# Patient Record
Sex: FEMALE | Race: ASIAN | NOT HISPANIC OR LATINO | URBAN - METROPOLITAN AREA
[De-identification: names, ages, dates, MRNs, and addresses within clinical notes are randomized per-mention and may not be internally consistent; named-entity substitution may affect disease eponyms.]

---

## 2020-09-27 ENCOUNTER — EMERGENCY (EMERGENCY)
Facility: HOSPITAL | Age: 55
LOS: 1 days | Discharge: HOME | End: 2020-09-27
Attending: EMERGENCY MEDICINE | Admitting: EMERGENCY MEDICINE
Payer: COMMERCIAL

## 2020-09-27 VITALS
RESPIRATION RATE: 16 BRPM | TEMPERATURE: 98 F | HEART RATE: 97 BPM | DIASTOLIC BLOOD PRESSURE: 118 MMHG | OXYGEN SATURATION: 100 % | SYSTOLIC BLOOD PRESSURE: 165 MMHG

## 2020-09-27 VITALS
RESPIRATION RATE: 16 BRPM | HEART RATE: 113 BPM | DIASTOLIC BLOOD PRESSURE: 110 MMHG | OXYGEN SATURATION: 97 % | TEMPERATURE: 98 F | SYSTOLIC BLOOD PRESSURE: 148 MMHG | HEIGHT: 65 IN

## 2020-09-27 PROCEDURE — 99283 EMERGENCY DEPT VISIT LOW MDM: CPT

## 2020-09-27 PROCEDURE — 73130 X-RAY EXAM OF HAND: CPT | Mod: 26,LT

## 2020-09-27 PROCEDURE — 99053 MED SERV 10PM-8AM 24 HR FAC: CPT

## 2020-09-27 RX ORDER — TETANUS TOXOID, REDUCED DIPHTHERIA TOXOID AND ACELLULAR PERTUSSIS VACCINE, ADSORBED 5; 2.5; 8; 8; 2.5 [IU]/.5ML; [IU]/.5ML; UG/.5ML; UG/.5ML; UG/.5ML
0.5 SUSPENSION INTRAMUSCULAR ONCE
Refills: 0 | Status: COMPLETED | OUTPATIENT
Start: 2020-09-27 | End: 2020-09-27

## 2020-09-27 RX ADMIN — TETANUS TOXOID, REDUCED DIPHTHERIA TOXOID AND ACELLULAR PERTUSSIS VACCINE, ADSORBED 0.5 MILLILITER(S): 5; 2.5; 8; 8; 2.5 SUSPENSION INTRAMUSCULAR at 23:37

## 2020-09-27 RX ADMIN — Medication 1 TABLET(S): at 23:36
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
## 2020-09-27 NOTE — ED PROVIDER NOTE - CONSTITUTIONAL, MLM
normal... Well appearing, awake, alert, oriented to person, place, time/situation and in no apparent distress. Well appearing, awake, alert, oriented to person, place, and in no apparent distress.
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
## 2020-09-27 NOTE — ED PROVIDER NOTE - PROGRESS NOTE DETAILS
MARK Rhodes: Spoke with [facility] [employer] night manager Briana, informed her pt will need to take Augmentin BID x 5 days, updated tetanus. Aids at bedside can call when pt is discharged to be picked up. MARK Rhodes: Spoke with [facility] night manager Briana, informed her pt will need to take Augmentin BID x 5 days, updated tetanus. Aids at bedside can call when pt is discharged to be picked up. Aids states pt is at her baseline mental status, pt is cooperative and calm throughout duration of visit, answering questions, pt with 2 aids not threatening to herself. Discussed with SW that aids called nora to be piked up in van. MARK Rhodes: Per CDC guidelines PEP not recommended for small volume incidents if source pt is unknown HIV status. Discussed with  MARK Rhodes: Spoke with N[facility] 40 night manager Briana, informed her pt will need to take Augmentin BID x 5 days, updated tetanus. States to write rx information on discharge paperwork. Aids at bedside can call when pt is discharged to be picked up. Aids states pt is at her baseline mental status, pt is cooperative and calm throughout duration of visit, answering questions, pt with 2 aids not threatening to herself. Discussed with SW that aids called nora to be piked up in van. MARK Rhodes: Per CDC guidelines PEP not recommended for small volume incidents if source pt is unknown HIV status. Discussed with , pt to follow up with her PMD. Attempted to reach Dr. Stevens who sent pt to [facility] for evaluation without response.
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
## 2020-09-27 NOTE — ED PROVIDER NOTE - NSFOLLOWUPINSTRUCTIONS_ED_ALL_ED_FT
Follow up with your primary care doctor within 1 week  Take Augmentin 875mg (1 tablet) twice daily for 5 days  Take Tylenol 650mg every 6 hours as needed for pain  Apply bacitracin to area daily and cover with bandage  Return to the ER with any worsening or concerning symptoms, increased pain, swelling, fever or any other concerns.

## 2020-09-27 NOTE — ED PROVIDER NOTE - PATIENT PORTAL LINK FT
You can access the FollowMyHealth Patient Portal offered by [facility] by registering at the following website: [email or web address]. By joining [employer]’s FollowMyHealth portal, you will also be able to view your health information using other applications (apps) compatible with our system.

## 2020-09-28 NOTE — PROVIDER CONTACT NOTE (OTHER) - ASSESSMENT
Per provider, Yolanda FLORES, pt is cleared and able to return to previous residence [facility] [address] [place], [place]. Pt is accompanied by two staff members. SW has spoken to TIGRE Jefferson RN ([phone]) who confirmed that pt's mode of transportation is facility vehicle and that pt travels with supervision. Clinical provider is in agreement with facility vehicle back to group home. Verbal huddle completed.

## 2020-09-28 NOTE — ED ADULT NURSE REASSESSMENT NOTE - NS ED NURSE REASSESS COMMENT FT1
pt A&OX3, calm and cooperative with two aids by bedside. safe discharge in progress. dc paper provided. social work contacted. report given to RN by MARK Gordon. awaiting for transport.

## 2022-01-18 ENCOUNTER — INPATIENT (INPATIENT)
Facility: HOSPITAL | Age: 57
LOS: 9 days | Discharge: PSYCHIATRIC OR SUBSTANCE USE FACILITY | End: 2022-01-28
Attending: HOSPITALIST | Admitting: HOSPITALIST
Payer: MEDICAID

## 2022-01-18 VITALS
SYSTOLIC BLOOD PRESSURE: 120 MMHG | HEART RATE: 123 BPM | TEMPERATURE: 98 F | DIASTOLIC BLOOD PRESSURE: 85 MMHG | HEIGHT: 65 IN | OXYGEN SATURATION: 99 % | RESPIRATION RATE: 18 BRPM

## 2022-01-18 LAB
ALBUMIN SERPL ELPH-MCNC: 4.5 G/DL — SIGNIFICANT CHANGE UP (ref 3.3–5)
ALP SERPL-CCNC: 139 U/L — HIGH (ref 40–120)
ALT FLD-CCNC: 11 U/L — SIGNIFICANT CHANGE UP (ref 4–33)
ANION GAP SERPL CALC-SCNC: 18 MMOL/L — HIGH (ref 7–14)
APPEARANCE UR: ABNORMAL
AST SERPL-CCNC: 12 U/L — SIGNIFICANT CHANGE UP (ref 4–32)
BACTERIA # UR AUTO: ABNORMAL
BASOPHILS # BLD AUTO: 0.06 K/UL — SIGNIFICANT CHANGE UP (ref 0–0.2)
BASOPHILS NFR BLD AUTO: 0.3 % — SIGNIFICANT CHANGE UP (ref 0–2)
BILIRUB SERPL-MCNC: 0.4 MG/DL — SIGNIFICANT CHANGE UP (ref 0.2–1.2)
BILIRUB UR-MCNC: ABNORMAL
BUN SERPL-MCNC: 19 MG/DL — SIGNIFICANT CHANGE UP (ref 7–23)
CALCIUM SERPL-MCNC: 11 MG/DL — HIGH (ref 8.4–10.5)
CHLORIDE SERPL-SCNC: 105 MMOL/L — SIGNIFICANT CHANGE UP (ref 98–107)
CO2 SERPL-SCNC: 17 MMOL/L — LOW (ref 22–31)
COLOR SPEC: YELLOW — SIGNIFICANT CHANGE UP
CREAT SERPL-MCNC: 0.71 MG/DL — SIGNIFICANT CHANGE UP (ref 0.5–1.3)
DIFF PNL FLD: NEGATIVE — SIGNIFICANT CHANGE UP
EOSINOPHIL # BLD AUTO: 0.13 K/UL — SIGNIFICANT CHANGE UP (ref 0–0.5)
EOSINOPHIL NFR BLD AUTO: 0.8 % — SIGNIFICANT CHANGE UP (ref 0–6)
EPI CELLS # UR: 13 /HPF — HIGH (ref 0–5)
GLUCOSE SERPL-MCNC: 201 MG/DL — HIGH (ref 70–99)
GLUCOSE UR QL: NEGATIVE — SIGNIFICANT CHANGE UP
HCT VFR BLD CALC: 49.1 % — HIGH (ref 34.5–45)
HGB BLD-MCNC: 16.5 G/DL — HIGH (ref 11.5–15.5)
HYALINE CASTS # UR AUTO: 9 /LPF — HIGH (ref 0–7)
IANC: 13.99 K/UL — HIGH (ref 1.5–8.5)
IMM GRANULOCYTES NFR BLD AUTO: 0.8 % — SIGNIFICANT CHANGE UP (ref 0–1.5)
KETONES UR-MCNC: ABNORMAL
LEUKOCYTE ESTERASE UR-ACNC: ABNORMAL
LITHIUM SERPL-MCNC: 1.1 MMOL/L — SIGNIFICANT CHANGE UP (ref 0.6–1.2)
LYMPHOCYTES # BLD AUTO: 1.67 K/UL — SIGNIFICANT CHANGE UP (ref 1–3.3)
LYMPHOCYTES # BLD AUTO: 9.7 % — LOW (ref 13–44)
MAGNESIUM SERPL-MCNC: 2.1 MG/DL — SIGNIFICANT CHANGE UP (ref 1.6–2.6)
MCHC RBC-ENTMCNC: 28.1 PG — SIGNIFICANT CHANGE UP (ref 27–34)
MCHC RBC-ENTMCNC: 33.6 GM/DL — SIGNIFICANT CHANGE UP (ref 32–36)
MCV RBC AUTO: 83.6 FL — SIGNIFICANT CHANGE UP (ref 80–100)
MONOCYTES # BLD AUTO: 1.29 K/UL — HIGH (ref 0–0.9)
MONOCYTES NFR BLD AUTO: 7.5 % — SIGNIFICANT CHANGE UP (ref 2–14)
NEUTROPHILS # BLD AUTO: 13.99 K/UL — HIGH (ref 1.8–7.4)
NEUTROPHILS NFR BLD AUTO: 80.9 % — HIGH (ref 43–77)
NITRITE UR-MCNC: NEGATIVE — SIGNIFICANT CHANGE UP
NRBC # BLD: 0 /100 WBCS — SIGNIFICANT CHANGE UP
NRBC # FLD: 0 K/UL — SIGNIFICANT CHANGE UP
PH UR: 6.5 — SIGNIFICANT CHANGE UP (ref 5–8)
PHOSPHATE SERPL-MCNC: 1.8 MG/DL — LOW (ref 2.5–4.5)
PLATELET # BLD AUTO: 478 K/UL — HIGH (ref 150–400)
POTASSIUM SERPL-MCNC: 2.6 MMOL/L — CRITICAL LOW (ref 3.5–5.3)
POTASSIUM SERPL-SCNC: 2.6 MMOL/L — CRITICAL LOW (ref 3.5–5.3)
PROT SERPL-MCNC: 7 G/DL — SIGNIFICANT CHANGE UP (ref 6–8.3)
PROT UR-MCNC: ABNORMAL
RBC # BLD: 5.87 M/UL — HIGH (ref 3.8–5.2)
RBC # FLD: 14.8 % — HIGH (ref 10.3–14.5)
RBC CASTS # UR COMP ASSIST: 8 /HPF — HIGH (ref 0–4)
SODIUM SERPL-SCNC: 140 MMOL/L — SIGNIFICANT CHANGE UP (ref 135–145)
SP GR SPEC: 1.03 — SIGNIFICANT CHANGE UP (ref 1–1.05)
TSH SERPL-MCNC: 0.45 UIU/ML — SIGNIFICANT CHANGE UP (ref 0.27–4.2)
UROBILINOGEN FLD QL: ABNORMAL
WBC # BLD: 17.27 K/UL — HIGH (ref 3.8–10.5)
WBC # FLD AUTO: 17.27 K/UL — HIGH (ref 3.8–10.5)
WBC UR QL: 14 /HPF — HIGH (ref 0–5)

## 2022-01-18 PROCEDURE — 93010 ELECTROCARDIOGRAM REPORT: CPT

## 2022-01-18 PROCEDURE — 99285 EMERGENCY DEPT VISIT HI MDM: CPT | Mod: 25

## 2022-01-18 PROCEDURE — 71045 X-RAY EXAM CHEST 1 VIEW: CPT | Mod: 26

## 2022-01-18 RX ORDER — SODIUM CHLORIDE 9 MG/ML
1000 INJECTION INTRAMUSCULAR; INTRAVENOUS; SUBCUTANEOUS ONCE
Refills: 0 | Status: COMPLETED | OUTPATIENT
Start: 2022-01-18 | End: 2022-01-18

## 2022-01-18 RX ORDER — POTASSIUM PHOSPHATE, MONOBASIC POTASSIUM PHOSPHATE, DIBASIC 236; 224 MG/ML; MG/ML
15 INJECTION, SOLUTION INTRAVENOUS ONCE
Refills: 0 | Status: COMPLETED | OUTPATIENT
Start: 2022-01-18 | End: 2022-01-19

## 2022-01-18 RX ORDER — MAGNESIUM SULFATE 500 MG/ML
2 VIAL (ML) INJECTION ONCE
Refills: 0 | Status: COMPLETED | OUTPATIENT
Start: 2022-01-18 | End: 2022-01-18

## 2022-01-18 RX ORDER — POTASSIUM CHLORIDE 20 MEQ
10 PACKET (EA) ORAL
Refills: 0 | Status: COMPLETED | OUTPATIENT
Start: 2022-01-18 | End: 2022-01-18

## 2022-01-18 RX ORDER — CEFTRIAXONE 500 MG/1
1000 INJECTION, POWDER, FOR SOLUTION INTRAMUSCULAR; INTRAVENOUS ONCE
Refills: 0 | Status: COMPLETED | OUTPATIENT
Start: 2022-01-18 | End: 2022-01-19

## 2022-01-18 RX ADMIN — Medication 100 MILLIEQUIVALENT(S): at 22:01

## 2022-01-18 RX ADMIN — Medication 25 GRAM(S): at 22:01

## 2022-01-18 RX ADMIN — Medication 2 MILLIGRAM(S): at 19:06

## 2022-01-18 RX ADMIN — SODIUM CHLORIDE 1000 MILLILITER(S): 9 INJECTION INTRAMUSCULAR; INTRAVENOUS; SUBCUTANEOUS at 19:50

## 2022-01-18 RX ADMIN — Medication 100 MILLIEQUIVALENT(S): at 23:25

## 2022-01-18 RX ADMIN — SODIUM CHLORIDE 1000 MILLILITER(S): 9 INJECTION INTRAMUSCULAR; INTRAVENOUS; SUBCUTANEOUS at 22:01
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
## 2022-01-18 NOTE — ED PROVIDER NOTE - CLINICAL SUMMARY MEDICAL DECISION MAKING FREE TEXT BOX
O'Anisha DO PGY-2: pt here from [facility] due to decreased PO intake, vomiting. Will eval for lithium toxicity. Ordered labs, cxr and Ua/uclx. Will eval for electrolyte abnormality and infectious etiology. Will reassess. Dispo pending workup.

## 2022-01-18 NOTE — ED PROVIDER NOTE - SECONDARY DIAGNOSIS.
QT prolongation Additional Notes: ** Patient will begin adjuvant therapy Targretin this week Detail Level: Simple Nausea and vomiting

## 2022-01-18 NOTE — ED PROVIDER NOTE - CARE PLAN
1 Principal Discharge DX:	Hypokalemia  Secondary Diagnosis:	Nausea and vomiting  Secondary Diagnosis:	QT prolongation
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
## 2022-01-18 NOTE — ED PROVIDER NOTE - PHYSICAL EXAMINATION
CONSTITUTIONAL: Well-developed; well-nourished; in no acute distress.   SKIN: warm, dry  HEAD: Normocephalic; atraumatic.  EYES: no conjunctival injection. PERRL.   ENT: No nasal discharge; airway clear.  NECK: Supple; non tender.  CARD: S1, S2 normal; no murmurs, gallops, or rubs. Regular rate and rhythm.   RESP: No wheezes, rales or rhonchi. Good air movement bilaterally.   ABD: soft ntnd, no guarding, no distention, no rigidity.   EXT: No cyanosis or edema.   NEURO: AOx1-2  PSYCH: Cooperative, appropriate.
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
## 2022-01-18 NOTE — ED PROVIDER NOTE - NS ED ROS FT
CONSTITUTIONAL - No fever, No diaphoresis, No weight change  EYES - No eye pain, No blurred vision  ENT - No change in hearing, No sore throat, No neck pain, No rhinorrhea, No ear pain  RESPIRATORY - No shortness of breath, No cough  CARDIAC -No chest pain, No palpitations  GI - No abdominal pain, No nausea, No vomiting, No diarrhea, No constipation  - No dysuria, no frequency, no hematuria.   MUSCULOSKELETAL - No joint pain, No swelling, No back pain  NEUROLOGIC - No numbness, No focal weakness, No headache, No dizziness

## 2022-01-18 NOTE — ED ADULT NURSE NOTE - OBJECTIVE STATEMENT
Receive pt. in ER room 20 alert and oriented x 2, presenting to the ER from [facility] with complaints of poor po intake and covid positive. Pt. is accompanied by staff Marisol who stated " She have a poor appetite and has not been eating well for a few days". Pt. is from building [address] in patient [facility] . Pt. is combative and is refusing to have labs drawn andr to put on her hospital gown. Dr. Sathya Knowles ER resident caring for patient made aware, pt. medicated as ordered, labs sent, place on cardiac monitor, safety maintained.

## 2022-01-19 DIAGNOSIS — E87.2 ACIDOSIS: ICD-10-CM

## 2022-01-19 DIAGNOSIS — R94.31 ABNORMAL ELECTROCARDIOGRAM [ECG] [EKG]: ICD-10-CM

## 2022-01-19 DIAGNOSIS — A41.9 SEPSIS, UNSPECIFIED ORGANISM: ICD-10-CM

## 2022-01-19 DIAGNOSIS — E87.6 HYPOKALEMIA: ICD-10-CM

## 2022-01-19 DIAGNOSIS — E83.52 HYPERCALCEMIA: ICD-10-CM

## 2022-01-19 DIAGNOSIS — F20.9 SCHIZOPHRENIA, UNSPECIFIED: ICD-10-CM

## 2022-01-19 DIAGNOSIS — E86.0 DEHYDRATION: ICD-10-CM

## 2022-01-19 DIAGNOSIS — Z29.9 ENCOUNTER FOR PROPHYLACTIC MEASURES, UNSPECIFIED: ICD-10-CM

## 2022-01-19 LAB
ANION GAP SERPL CALC-SCNC: 11 MMOL/L — SIGNIFICANT CHANGE UP (ref 7–14)
ANION GAP SERPL CALC-SCNC: 11 MMOL/L — SIGNIFICANT CHANGE UP (ref 7–14)
BASOPHILS # BLD AUTO: 0.04 K/UL — SIGNIFICANT CHANGE UP (ref 0–0.2)
BASOPHILS NFR BLD AUTO: 0.3 % — SIGNIFICANT CHANGE UP (ref 0–2)
BLOOD GAS VENOUS COMPREHENSIVE RESULT: SIGNIFICANT CHANGE UP
BUN SERPL-MCNC: 12 MG/DL — SIGNIFICANT CHANGE UP (ref 7–23)
BUN SERPL-MCNC: 16 MG/DL — SIGNIFICANT CHANGE UP (ref 7–23)
CALCIUM SERPL-MCNC: 9 MG/DL — SIGNIFICANT CHANGE UP (ref 8.4–10.5)
CALCIUM SERPL-MCNC: 9.7 MG/DL — SIGNIFICANT CHANGE UP (ref 8.4–10.5)
CHLORIDE SERPL-SCNC: 109 MMOL/L — HIGH (ref 98–107)
CHLORIDE SERPL-SCNC: 112 MMOL/L — HIGH (ref 98–107)
CO2 SERPL-SCNC: 21 MMOL/L — LOW (ref 22–31)
CO2 SERPL-SCNC: 21 MMOL/L — LOW (ref 22–31)
CREAT SERPL-MCNC: 0.66 MG/DL — SIGNIFICANT CHANGE UP (ref 0.5–1.3)
CREAT SERPL-MCNC: 0.81 MG/DL — SIGNIFICANT CHANGE UP (ref 0.5–1.3)
CULTURE RESULTS: SIGNIFICANT CHANGE UP
EOSINOPHIL # BLD AUTO: 0.17 K/UL — SIGNIFICANT CHANGE UP (ref 0–0.5)
EOSINOPHIL NFR BLD AUTO: 1.1 % — SIGNIFICANT CHANGE UP (ref 0–6)
GLUCOSE SERPL-MCNC: 114 MG/DL — HIGH (ref 70–99)
GLUCOSE SERPL-MCNC: 146 MG/DL — HIGH (ref 70–99)
HCT VFR BLD CALC: 40.2 % — SIGNIFICANT CHANGE UP (ref 34.5–45)
HGB BLD-MCNC: 13.6 G/DL — SIGNIFICANT CHANGE UP (ref 11.5–15.5)
IANC: 12.3 K/UL — HIGH (ref 1.5–8.5)
IMM GRANULOCYTES NFR BLD AUTO: 0.7 % — SIGNIFICANT CHANGE UP (ref 0–1.5)
LYMPHOCYTES # BLD AUTO: 1.23 K/UL — SIGNIFICANT CHANGE UP (ref 1–3.3)
LYMPHOCYTES # BLD AUTO: 8.2 % — LOW (ref 13–44)
MAGNESIUM SERPL-MCNC: 2.5 MG/DL — SIGNIFICANT CHANGE UP (ref 1.6–2.6)
MAGNESIUM SERPL-MCNC: 2.6 MG/DL — SIGNIFICANT CHANGE UP (ref 1.6–2.6)
MCHC RBC-ENTMCNC: 28.1 PG — SIGNIFICANT CHANGE UP (ref 27–34)
MCHC RBC-ENTMCNC: 33.8 GM/DL — SIGNIFICANT CHANGE UP (ref 32–36)
MCV RBC AUTO: 83.1 FL — SIGNIFICANT CHANGE UP (ref 80–100)
MONOCYTES # BLD AUTO: 1.21 K/UL — HIGH (ref 0–0.9)
MONOCYTES NFR BLD AUTO: 8 % — SIGNIFICANT CHANGE UP (ref 2–14)
NEUTROPHILS # BLD AUTO: 12.3 K/UL — HIGH (ref 1.8–7.4)
NEUTROPHILS NFR BLD AUTO: 81.7 % — HIGH (ref 43–77)
NRBC # BLD: 0 /100 WBCS — SIGNIFICANT CHANGE UP
NRBC # FLD: 0 K/UL — SIGNIFICANT CHANGE UP
PHOSPHATE SERPL-MCNC: 2.2 MG/DL — LOW (ref 2.5–4.5)
PHOSPHATE SERPL-MCNC: 2.8 MG/DL — SIGNIFICANT CHANGE UP (ref 2.5–4.5)
PLATELET # BLD AUTO: 337 K/UL — SIGNIFICANT CHANGE UP (ref 150–400)
POTASSIUM SERPL-MCNC: 3 MMOL/L — LOW (ref 3.5–5.3)
POTASSIUM SERPL-MCNC: 3.7 MMOL/L — SIGNIFICANT CHANGE UP (ref 3.5–5.3)
POTASSIUM SERPL-SCNC: 3 MMOL/L — LOW (ref 3.5–5.3)
POTASSIUM SERPL-SCNC: 3.7 MMOL/L — SIGNIFICANT CHANGE UP (ref 3.5–5.3)
PROCALCITONIN SERPL-MCNC: 0.1 NG/ML — SIGNIFICANT CHANGE UP (ref 0.02–0.1)
RBC # BLD: 4.84 M/UL — SIGNIFICANT CHANGE UP (ref 3.8–5.2)
RBC # FLD: 14.9 % — HIGH (ref 10.3–14.5)
SARS-COV-2 RNA SPEC QL NAA+PROBE: SIGNIFICANT CHANGE UP
SODIUM SERPL-SCNC: 141 MMOL/L — SIGNIFICANT CHANGE UP (ref 135–145)
SODIUM SERPL-SCNC: 144 MMOL/L — SIGNIFICANT CHANGE UP (ref 135–145)
SPECIMEN SOURCE: SIGNIFICANT CHANGE UP
WBC # BLD: 15.05 K/UL — HIGH (ref 3.8–10.5)
WBC # FLD AUTO: 15.05 K/UL — HIGH (ref 3.8–10.5)

## 2022-01-19 PROCEDURE — 99223 1ST HOSP IP/OBS HIGH 75: CPT | Mod: GC

## 2022-01-19 PROCEDURE — 90792 PSYCH DIAG EVAL W/MED SRVCS: CPT

## 2022-01-19 PROCEDURE — 12345: CPT | Mod: NC

## 2022-01-19 RX ORDER — LANOLIN ALCOHOL/MO/W.PET/CERES
3 CREAM (GRAM) TOPICAL AT BEDTIME
Refills: 0 | Status: DISCONTINUED | OUTPATIENT
Start: 2022-01-19 | End: 2022-01-28

## 2022-01-19 RX ORDER — ENOXAPARIN SODIUM 100 MG/ML
40 INJECTION SUBCUTANEOUS DAILY
Refills: 0 | Status: DISCONTINUED | OUTPATIENT
Start: 2022-01-19 | End: 2022-01-28

## 2022-01-19 RX ORDER — CHOLECALCIFEROL (VITAMIN D3) 125 MCG
1 CAPSULE ORAL
Qty: 0 | Refills: 0 | DISCHARGE

## 2022-01-19 RX ORDER — ACETAMINOPHEN 500 MG
650 TABLET ORAL EVERY 6 HOURS
Refills: 0 | Status: DISCONTINUED | OUTPATIENT
Start: 2022-01-19 | End: 2022-01-28

## 2022-01-19 RX ORDER — POLYETHYLENE GLYCOL 3350 17 G/17G
17 POWDER, FOR SOLUTION ORAL DAILY
Refills: 0 | Status: DISCONTINUED | OUTPATIENT
Start: 2022-01-19 | End: 2022-01-28

## 2022-01-19 RX ORDER — POTASSIUM PHOSPHATE, MONOBASIC POTASSIUM PHOSPHATE, DIBASIC 236; 224 MG/ML; MG/ML
15 INJECTION, SOLUTION INTRAVENOUS ONCE
Refills: 0 | Status: COMPLETED | OUTPATIENT
Start: 2022-01-19 | End: 2022-01-19

## 2022-01-19 RX ORDER — SENNA PLUS 8.6 MG/1
2 TABLET ORAL AT BEDTIME
Refills: 0 | Status: DISCONTINUED | OUTPATIENT
Start: 2022-01-19 | End: 2022-01-28

## 2022-01-19 RX ORDER — ONDANSETRON 8 MG/1
4 TABLET, FILM COATED ORAL EVERY 8 HOURS
Refills: 0 | Status: DISCONTINUED | OUTPATIENT
Start: 2022-01-19 | End: 2022-01-19

## 2022-01-19 RX ORDER — SODIUM CHLORIDE 9 MG/ML
1000 INJECTION INTRAMUSCULAR; INTRAVENOUS; SUBCUTANEOUS
Refills: 0 | Status: DISCONTINUED | OUTPATIENT
Start: 2022-01-19 | End: 2022-01-28

## 2022-01-19 RX ORDER — CEFTRIAXONE 500 MG/1
1000 INJECTION, POWDER, FOR SOLUTION INTRAMUSCULAR; INTRAVENOUS EVERY 24 HOURS
Refills: 0 | Status: DISCONTINUED | OUTPATIENT
Start: 2022-01-19 | End: 2022-01-24

## 2022-01-19 RX ORDER — LEVOTHYROXINE SODIUM 125 MCG
25 TABLET ORAL DAILY
Refills: 0 | Status: DISCONTINUED | OUTPATIENT
Start: 2022-01-19 | End: 2022-01-28

## 2022-01-19 RX ORDER — POTASSIUM CHLORIDE 20 MEQ
10 PACKET (EA) ORAL
Refills: 0 | Status: COMPLETED | OUTPATIENT
Start: 2022-01-19 | End: 2022-01-19

## 2022-01-19 RX ORDER — POTASSIUM CHLORIDE 20 MEQ
20 PACKET (EA) ORAL
Refills: 0 | Status: COMPLETED | OUTPATIENT
Start: 2022-01-19 | End: 2022-01-19

## 2022-01-19 RX ORDER — LITHIUM CARBONATE 300 MG/1
600 TABLET, EXTENDED RELEASE ORAL DAILY
Refills: 0 | Status: DISCONTINUED | OUTPATIENT
Start: 2022-01-19 | End: 2022-01-24

## 2022-01-19 RX ORDER — SENNA PLUS 8.6 MG/1
1 TABLET ORAL
Qty: 0 | Refills: 0 | DISCHARGE

## 2022-01-19 RX ORDER — LAMOTRIGINE 25 MG/1
100 TABLET, ORALLY DISINTEGRATING ORAL DAILY
Refills: 0 | Status: DISCONTINUED | OUTPATIENT
Start: 2022-01-19 | End: 2022-01-24

## 2022-01-19 RX ADMIN — ENOXAPARIN SODIUM 40 MILLIGRAM(S): 100 INJECTION SUBCUTANEOUS at 11:49

## 2022-01-19 RX ADMIN — Medication 100 MILLIEQUIVALENT(S): at 14:54

## 2022-01-19 RX ADMIN — Medication 100 MILLIEQUIVALENT(S): at 11:49

## 2022-01-19 RX ADMIN — POLYETHYLENE GLYCOL 3350 17 GRAM(S): 17 POWDER, FOR SOLUTION ORAL at 11:49

## 2022-01-19 RX ADMIN — POTASSIUM PHOSPHATE, MONOBASIC POTASSIUM PHOSPHATE, DIBASIC 62.5 MILLIMOLE(S): 236; 224 INJECTION, SOLUTION INTRAVENOUS at 16:09

## 2022-01-19 RX ADMIN — Medication 100 MILLIEQUIVALENT(S): at 00:57

## 2022-01-19 RX ADMIN — POTASSIUM PHOSPHATE, MONOBASIC POTASSIUM PHOSPHATE, DIBASIC 62.5 MILLIMOLE(S): 236; 224 INJECTION, SOLUTION INTRAVENOUS at 02:45

## 2022-01-19 RX ADMIN — SODIUM CHLORIDE 30 MILLILITER(S): 9 INJECTION INTRAMUSCULAR; INTRAVENOUS; SUBCUTANEOUS at 14:54

## 2022-01-19 RX ADMIN — CEFTRIAXONE 100 MILLIGRAM(S): 500 INJECTION, POWDER, FOR SOLUTION INTRAMUSCULAR; INTRAVENOUS at 02:09

## 2022-01-19 RX ADMIN — Medication 100 MILLIEQUIVALENT(S): at 13:45

## 2022-01-19 NOTE — H&P ADULT - ASSESSMENT
56 year old female with hx of schizophrenia, hypothyroidism, coming in from [facility] for decreased PO intake, Nausea and vomiting, concern for lithium toxicity but ultimately ruled out, found to have leukocytosis and positive UA concerning for UTI and metabolic derangements 2/2 dehydration and vomiting.

## 2022-01-19 NOTE — PROGRESS NOTE ADULT - SUBJECTIVE AND OBJECTIVE BOX
***INCOMPLETE*****    INTERVAL HPI/OVERNIGHT EVENTS:  Patient was seen and examined at bedside. As per nurse and patient, no o/n events, patient resting comfortably. No complaints at this time. Patient denies: fever, chills, dizziness, weakness, HA, Changes in vision, CP, palpitations, SOB, cough, N/V/D/C, dysuria, changes in bowel movements, LE edema. ROS otherwise negative.    VITAL SIGNS:  T(F): 98.3 (22 @ 15:50)  HR: 91 (22 @ 15:50)  BP: 137/93 (22 @ 15:50)  RR: 15 (22 @ 15:50)  SpO2: 100% (22 @ 15:50)  Wt(kg): --    PHYSICAL EXAM:    Constitutional: WDWN, NAD  HEENT: PERRL, EOMI, sclera non-icteric, neck supple, trachea midline, no masses, no JVD, MMM, good dentition  Respiratory: CTA b/l, good air entry b/l, no wheezing, no rhonchi, no rales, without accessory muscle use and no intercostal retractions  Cardiovascular: RRR, normal S1S2, no M/R/G  Gastrointestinal: soft, NTND, no masses palpable, BS normal  Extremities: Warm, well perfused, pulses equal bilateral upper and lower extremities, no edema, no clubbing  Neurological: AAOx3, CN Grossly intact  Skin: Normal temperature, warm, dry    MEDICATIONS  (STANDING):  cefTRIAXone   IVPB 1000 milliGRAM(s) IV Intermittent every 24 hours  enoxaparin Injectable 40 milliGRAM(s) SubCutaneous daily  lamoTRIgine 100 milliGRAM(s) Oral daily  levothyroxine 25 MICROGram(s) Oral daily  lithium 600 milliGRAM(s) Oral daily  polyethylene glycol 3350 17 Gram(s) Oral daily  senna 2 Tablet(s) Oral at bedtime  sodium chloride 0.9%. 1000 milliLiter(s) (30 mL/Hr) IV Continuous <Continuous>    MEDICATIONS  (PRN):  acetaminophen     Tablet .. 650 milliGRAM(s) Oral every 6 hours PRN Temp greater or equal to 38C (100.4F), Mild Pain (1 - 3)  aluminum hydroxide/magnesium hydroxide/simethicone Suspension 30 milliLiter(s) Oral every 4 hours PRN Dyspepsia  melatonin 3 milliGRAM(s) Oral at bedtime PRN Insomnia      Allergies    No Known Allergies    Intolerances        LABS:                        13.6   15.05 )-----------( 337      ( 2022 03:31 )             40.2         144  |  112<H>  |  12  ----------------------------<  114<H>  3.7   |  21<L>  |  0.66    Ca    9.7      2022 17:05  Phos  2.8       Mg     2.50         TPro  7.0  /  Alb  4.5  /  TBili  0.4  /  DBili  x   /  AST  12  /  ALT  11  /  AlkPhos  139<H>        Urinalysis Basic - ( 2022 20:41 )    Color: Yellow / Appearance: Slightly Turbid / S.026 / pH: x  Gluc: x / Ketone: Large  / Bili: Moderate / Urobili: 6 mg/dL   Blood: x / Protein: 100 mg/dL / Nitrite: Negative   Leuk Esterase: Moderate / RBC: 8 /HPF / WBC 14 /HPF   Sq Epi: x / Non Sq Epi: 13 /HPF / Bacteria: Few        RADIOLOGY & ADDITIONAL TESTS:  Reviewed INTERVAL HPI/OVERNIGHT EVENTS:  Patient was seen and examined at bedside patient complaining about how the needles from blood draws hurt her and that she hates needles. Patient intermittently tearful on exam. ROS otherwise negative.     VITAL SIGNS:  Vital Signs Last 24 Hrs  T(C): 36.8 (2022 15:50), Max: 37.1 (2022 21:23)  T(F): 98.3 (2022 15:50), Max: 98.7 (2022 21:23)  HR: 91 (2022 15:50) (88 - 106)  BP: 137/93 (2022 15:50) (113/73 - 142/100)  BP(mean): 94 (2022 03:27) (94 - 94)  RR: 15 (2022 15:50) (12 - 18)  SpO2: 100% (2022 15:50) (100% - 100%)    PHYSICAL EXAM:    Constitutional: Middle aged woman, intermittently tearful   HEENT: sclera non-icteric, neck supple, trachea midline, no masses, no JVD, MMM   Respiratory: CTA b/l, good air entry b/l, no wheezing, no rhonchi, no rales, without accessory muscle use and no intercostal retractions  Cardiovascular: RRR, normal S1S2, no M/R/G  Gastrointestinal: soft, NTND, no masses palpable, BS normal  Extremities: Warm, well perfused, pulses equal bilateral upper and lower extremities, no edema, no clubbing  Neurological: no focal deficits   Skin: Normal temperature, warm, dry    MEDICATIONS  (STANDING):  cefTRIAXone   IVPB 1000 milliGRAM(s) IV Intermittent every 24 hours  enoxaparin Injectable 40 milliGRAM(s) SubCutaneous daily  lamoTRIgine 100 milliGRAM(s) Oral daily  levothyroxine 25 MICROGram(s) Oral daily  lithium 600 milliGRAM(s) Oral daily  polyethylene glycol 3350 17 Gram(s) Oral daily  senna 2 Tablet(s) Oral at bedtime  sodium chloride 0.9%. 1000 milliLiter(s) (30 mL/Hr) IV Continuous <Continuous>    MEDICATIONS  (PRN):  acetaminophen     Tablet .. 650 milliGRAM(s) Oral every 6 hours PRN Temp greater or equal to 38C (100.4F), Mild Pain (1 - 3)  aluminum hydroxide/magnesium hydroxide/simethicone Suspension 30 milliLiter(s) Oral every 4 hours PRN Dyspepsia  melatonin 3 milliGRAM(s) Oral at bedtime PRN Insomnia        Allergies    No Known Allergies    Intolerances    LABS:                         13.6   15.05 )-----------( 337      ( 2022 03:31 )             40.2         144  |  112<H>  |  12  ----------------------------<  114<H>  3.7   |  21<L>  |  0.66    Ca    9.7      2022 17:05  Phos  2.8       Mg     2.50         TPro  7.0  /  Alb  4.5  /  TBili  0.4  /  DBili  x   /  AST  12  /  ALT  11  /  AlkPhos  139<H>        Urinalysis Basic - ( 2022 20:41 )    Color: Yellow / Appearance: Slightly Turbid / S.026 / pH: x  Gluc: x / Ketone: Large  / Bili: Moderate / Urobili: 6 mg/dL   Blood: x / Protein: 100 mg/dL / Nitrite: Negative   Leuk Esterase: Moderate / RBC: 8 /HPF / WBC 14 /HPF   Sq Epi: x / Non Sq Epi: 13 /HPF / Bacteria: Few                RADIOLOGY, EKG & ADDITIONAL TESTS: Reviewed.

## 2022-01-19 NOTE — PROGRESS NOTE ADULT - PROBLEM SELECTOR PLAN 7
-on lamotrigine, lithium, clozapine   -lithium level 1.1 so lithium toxicity is ruled out   -patient from [place]; day team to reach out to patients psychiatrist to discuss medication regimen and what doses to start -on lamotrigine, lithium, clozapine   -lithium level 1.1 so lithium toxicity unlikely   - BH consulted regimen as above  - cannot leave AMA

## 2022-01-19 NOTE — BH CONSULTATION LIAISON ASSESSMENT NOTE - OTHER
Pt's nursing aide from [facility] at bedside Denies auditory/visual hallucinations extremely irritated, angry and yelling for examiners to leave her alone

## 2022-01-19 NOTE — BH CONSULTATION LIAISON ASSESSMENT NOTE - NSBHCONSULTRECOMMENDOTHER_PSY_A_CORE FT
Patient with leukocytosis, though severity of this is unlikely to be explained via lithium related demargination. Currently being treated for UTI. Patient irritable, childish on exam.     Would f/u CBC with ANC regularly   Unclear if patient had vomited or received her clozapine last night  Given prolonged QTC would HOLD Clozapine tonight  If QTc is <500 tomorrow then OK to restart Clozapine at 25mg qHS (would NOT restart at full dose)  Continue Lithium 600mg QD  Repeat Lithium level at trough immediately PRIOR to AM dose  Continue Lamotrigine 100mg QD    Cannot leave AMA

## 2022-01-19 NOTE — H&P ADULT - NSHPLABSRESULTS_GEN_ALL_CORE
Personally reviewed imaging, labs, EKG  LABS:                        13.6   15.05 )-----------( 337      ( 2022 03:31 )             40.2         140  |  105  |  19  ----------------------------<  201<H>  2.6<LL>   |  17<L>  |  0.71    Ca    11.0<H>      2022 20:27  Phos  1.8       Mg     2.10         TPro  7.0  /  Alb  4.5  /  TBili  0.4  /  DBili  x   /  AST  12  /  ALT  11  /  AlkPhos  139<H>            Urinalysis Basic - ( 2022 20:41 )    Color: Yellow / Appearance: Slightly Turbid / S.026 / pH: x  Gluc: x / Ketone: Large  / Bili: Moderate / Urobili: 6 mg/dL   Blood: x / Protein: 100 mg/dL / Nitrite: Negative   Leuk Esterase: Moderate / RBC: 8 /HPF / WBC 14 /HPF   Sq Epi: x / Non Sq Epi: 13 /HPF / Bacteria: Few        RADIOLOGY & ADDITIONAL TESTS:  EKG:   Sinus tachycardia, QTc 534   Imaging Personally Reviewed: Personally reviewed imaging, labs, EKG  LABS:                        13.6   15.05 )-----------( 337      ( 2022 03:31 )             40.2         140  |  105  |  19  ----------------------------<  201<H>  2.6<LL>   |  17<L>  |  0.71    Ca    11.0<H>      2022 20:27  Phos  1.8       Mg     2.10         TPro  7.0  /  Alb  4.5  /  TBili  0.4  /  DBili  x   /  AST  12  /  ALT  11  /  AlkPhos  139<H>      Urinalysis Basic - ( 2022 20:41 )  Color: Yellow / Appearance: Slightly Turbid / S.026 / pH: x  Gluc: x / Ketone: Large  / Bili: Moderate / Urobili: 6 mg/dL   Blood: x / Protein: 100 mg/dL / Nitrite: Negative   Leuk Esterase: Moderate / RBC: 8 /HPF / WBC 14 /HPF   Sq Epi: x / Non Sq Epi: 13 /HPF / Bacteria: Few      RADIOLOGY & ADDITIONAL TESTS:  EKG:   Sinus tachycardia, rate 113, no significant ST-T wave changes, prolonged AT interval, QTc 534     Imaging Personally Reviewed:  < from: Xray Chest 1 View- PORTABLE-Urgent (Xray Chest 1 View- PORTABLE-Urgent .) (22 @ 19:13) >  ******PRELIMINARY REPORT******   INTERPRETATION:  Clear lungs.  < end of copied text >

## 2022-01-19 NOTE — H&P ADULT - PROBLEM SELECTOR PLAN 1
Leukocytosis with tachycardia, source of infection likely urine with UA showing mod leuk esterase with WBC and bacteria   -s/p 2L IVF in ED   -s/p 1G ceftriaxone in ED  -UCx pending, Procal pending, BCx not obtained prior to administration of abx so yield may be low but can still obtain   -continue with IVF hydration, monitor WBC and fever Leukocytosis with tachycardia, source of infection likely urine with UA showing mod leuk esterase with WBC and bacteria   -s/p 2L IVF in ED   -s/p 1G ceftriaxone in ED  -UCx pending, Procal pending, BCx pending  -continue with IVF hydration, monitor WBC and fever

## 2022-01-19 NOTE — H&P ADULT - NSHPREVIEWOFSYSTEMS_GEN_ALL_CORE
CONSTITUTIONAL: No weakness, fevers or chills  EYES/ENT: No visual changes;  No vertigo or throat pain   NECK: No pain or stiffness  RESPIRATORY: No cough, wheezing, hemoptysis; No shortness of breath  CARDIOVASCULAR: No chest pain or palpitations  GASTROINTESTINAL: No abdominal or epigastric pain. No nausea, vomiting, or hematemesis; No diarrhea or constipation. No melena or hematochezia.  GENITOURINARY: No dysuria, frequency or hematuria  NEUROLOGICAL: No numbness or weakness  SKIN: No itching, burning, rashes, or lesions   All other review of systems is negative unless indicated above. CONSTITUTIONAL: No weakness, fevers or chills  EYES: No visual changes or eye discharge  ENT: No rhinorrhea or sore throat  NECK: No pain or stiffness  RESPIRATORY: No cough, wheezing, hemoptysis; No shortness of breath  CARDIOVASCULAR: No chest pain or palpitations  GASTROINTESTINAL: No abdominal or epigastric pain. No nausea, vomiting, or hematemesis; No diarrhea or constipation. No melena or hematochezia.  GENITOURINARY: No dysuria, frequency or hematuria  NEUROLOGICAL: No numbness or weakness  SKIN: No itching, burning, rashes, or lesions   All other review of systems is negative unless indicated above.

## 2022-01-19 NOTE — BH CONSULTATION LIAISON ASSESSMENT NOTE - CURRENT MEDICATION
MEDICATIONS  (STANDING):  cefTRIAXone   IVPB 1000 milliGRAM(s) IV Intermittent every 24 hours  enoxaparin Injectable 40 milliGRAM(s) SubCutaneous daily  levothyroxine 25 MICROGram(s) Oral daily  polyethylene glycol 3350 17 Gram(s) Oral daily  senna 2 Tablet(s) Oral at bedtime  sodium chloride 0.9%. 1000 milliLiter(s) (30 mL/Hr) IV Continuous <Continuous>    MEDICATIONS  (PRN):  acetaminophen     Tablet .. 650 milliGRAM(s) Oral every 6 hours PRN Temp greater or equal to 38C (100.4F), Mild Pain (1 - 3)  aluminum hydroxide/magnesium hydroxide/simethicone Suspension 30 milliLiter(s) Oral every 4 hours PRN Dyspepsia  melatonin 3 milliGRAM(s) Oral at bedtime PRN Insomnia

## 2022-01-19 NOTE — PATIENT PROFILE ADULT - LIVING ENVIRONMENT
PAST MEDICAL HISTORY:  Anxiety     Cataract     [place] (hard of hearing) right ear     HTN (hypertension)     HTN (hypertension)     Obesity      no

## 2022-01-19 NOTE — H&P ADULT - PROBLEM SELECTOR PLAN 8
-lovenox for dvt ppx   -aspiration precautions   -dispo back to [facility] -lovenox for dvt ppx   -Diet: on low salt, low cholesterol diet at [facility], tolerated diet in ED as per RN  -aspiration precautions, fall precautions, ambulate with assistance  -dispo back to [facility]

## 2022-01-19 NOTE — BH CONSULTATION LIAISON ASSESSMENT NOTE - NSBHCHARTREVIEWVS_PSY_A_CORE FT
Vital Signs Last 24 Hrs  T(C): 36.8 (19 Jan 2022 15:50), Max: 37.4 (18 Jan 2022 19:50)  T(F): 98.3 (19 Jan 2022 15:50), Max: 99.4 (18 Jan 2022 19:50)  HR: 91 (19 Jan 2022 15:50) (88 - 123)  BP: 137/93 (19 Jan 2022 15:50) (113/73 - 142/100)  BP(mean): 94 (19 Jan 2022 03:27) (94 - 94)  RR: 15 (19 Jan 2022 15:50) (12 - 19)  SpO2: 100% (19 Jan 2022 15:50) (99% - 100%)

## 2022-01-19 NOTE — BH CONSULTATION LIAISON ASSESSMENT NOTE - DIFFERENTIAL
No Presenting symptoms of nausea/vomiting with serum glucose >200 mg/dL and high anion gap (18 on 1/18 and 11 on 1/19) metabolic acidosis most likely secondary to diabetic ketoacidosis, possibly secondary to long-term clozapine use. Other considerations to rule out include lithium toxicity (lithium level 1.1, though would need to reassess), possible UTI given leukocytosis on UA, and GI manifestations of COVID-19 (nausea/vomiting, abdominal pain, etc.).

## 2022-01-19 NOTE — H&P ADULT - ATTENDING COMMENTS
Patient seen and examined on 1/19/2022, case discussed with Dr. Bob Multani. This is a 56F with history as above who presents to the hospital from [place] with poor POintake, and nausea/emesis for the past few days. Here found to have sepsis 2/2 UTI, abdomen currently benign, tolerated diet in ED, UA positive, has leukocytosis, tachycardia. Septic 2/2 UTI, likely had nausea/emesis 2/2 UTI, currently no TTP over bladder or in CVA areas. Would c/w ceftriaxone, IVF, electrolyte repletion. QT prolonged in setting of electrolyte derangements, would hold clozapine for now, repeat EKG for QT interval later today, restart if QT interval improved. Other management as above.

## 2022-01-19 NOTE — H&P ADULT - PROBLEM SELECTOR PLAN 7
-on lamotrigine, lithium, clozapine   -lithium level 1.1 so lithium toxicity is ruled out   [facility] day team to reach out to patients psychiatrist to discuss medication regimen and what doses to start -on lamotrigine, lithium, clozapine   -lithium level 1.1 so lithium toxicity is ruled out   -patient from [place]; day team to reach out to patients psychiatrist to discuss medication regimen and what doses to start

## 2022-01-19 NOTE — BH CONSULTATION LIAISON ASSESSMENT NOTE - NSBHCHARTREVIEWLAB_PSY_A_CORE FT
13.6   15.05 )-----------( 337      ( 19 Jan 2022 03:31 )             40.2     01-19    141  |  109<H>  |  16  ----------------------------<  146<H>  3.0<L>   |  21<L>  |  0.81    Ca    9.0      19 Jan 2022 03:31  Phos  2.2     01-19  Mg     2.60     01-19    TPro  7.0  /  Alb  4.5  /  TBili  0.4  /  DBili  x   /  AST  12  /  ALT  11  /  AlkPhos  139<H>  01-18

## 2022-01-19 NOTE — BH CONSULTATION LIAISON ASSESSMENT NOTE - HPI (INCLUDE ILLNESS QUALITY, SEVERITY, DURATION, TIMING, CONTEXT, MODIFYING FACTORS, ASSOCIATED SIGNS AND SYMPTOMS)
Patient is a 56 year old female, single, domiciled at [facility] [place] for many years, PMHx of hyperlipidemia (pt does not want statin per chart), obesity, HTN, Vit D def, hypothyroidism, PPHx of schizophrenia, seen for psychotropic medications management following hold/decrease on medications while in COVID-19 isolation at [facility] from 1/3 -1/13 with decreased PO intake and vomiting x2 on 1/16/2022. Per [facility] paperwork, patient had clozapine decreased from 200mg daily to 100mg daily and lithium on hold for 1/18/22 for concern of lithium toxicity. Recent bloodwork from 1/3/22 revealed 766 clozapine level (normal 300-700). Bloodwork from today 1/19/22 showed Na 141 (wnl), hypokalemia (K+ = 3.0), hyperchloremia (Cl = 109), BUN (16) and creatinine (0.81) wnl. Lithium level 1.1 on 1/18/22 (normal 0.6-1.2).     Patient seen and examined at bedside. She was agitated and kept yelling for examiners to "Go away" and "Get out of this room, I don't want to talk to you." Pt denies headache, nausea, vomiting, chest pain, fever, shortness of breath, abdominal pain, constipation, diarrhea. Denies any SIIP/HIIP, paranoia, AVH, or plan/intent to harm herself or others. Pt was irritable and uncooperative with further questioning. Unable to assess further for safety.    Per her aide from [place] at bedside, pt is irritable and easily agitated at baseline, unable to tend to her ADLs without assistance. She received a potassium infusion prior to our examining her, and the burning sensation from the K+ infusion caused her to be even more irritable than usual.     In ED: patient afebrile, tachycardic up to 120s, 100% on RA, /82   Given 1G ceftriaxone, 2L NS, 2mg IV ativan, Potassium repletion

## 2022-01-19 NOTE — H&P ADULT - PROBLEM SELECTOR PLAN 2
-likely i/s/o vomiting and dehydration   -s/p K+ repletion, will recheck BMP and replete as necessary -in setting of metabolic derangements   -repeat EKG at 12 to check QTc   -psych eval in AM to see which medications to restart

## 2022-01-19 NOTE — H&P ADULT - HISTORY OF PRESENT ILLNESS
PMHx of hyperlipidemia (pt does not want statin per chart), obesity, HTN non compliant to meds/BP measurements, Vit D def, hypothyroidism, schizophrenia, from [facility] where she was in isolation from 1/3 -1/13 for COVID-19 isolation, has decreased PO intake and vomiting x2 on 1/16 and decreased energy. Per [facility] paperwork, patient had clozapine decreased from 200mf daily to 100mg daily and lithium on hold for 1/18/22 for concern of lithium toxicity. Recent bloodwork from 1/3/22 revealed 766 clozapine level (normal 300-700), WBC 7.7, Hgn 14.4, SCr .6, Na 130, K 3.7. Lithium level 0.32.     Patient seen and examined at bedside, poor historian. denying all ROS complaints including nausea, vomiting, chest pain, fever, shortness of breath, abdominal pain, constipation, diarrhea. seems irritated on interview, oriented to date and name, not place. [facility] to [facility] infection, lithium toxicity.  56 year old female w/ PMHx of hyperlipidemia (pt does not want statin per chart), obesity, HTN non compliant to meds/BP measurements, Vit D def, hypothyroidism, schizophrenia, from [facility] where she was in isolation from 1/3 -1/13 for COVID-19 isolation, has decreased PO intake and vomiting x2 on 1/16 and decreased energy. Per [facility] paperwork, patient had clozapine decreased from 200mf daily to 100mg daily and lithium on hold for 1/18/22 for concern of lithium toxicity. Recent bloodwork from 1/3/22 revealed 766 clozapine level (normal 300-700), WBC 7.7, Hgn 14.4, SCr .6, Na 130, K 3.7. Lithium level 0.32.     Patient seen and examined at bedside, poor historian. denying all ROS complaints including nausea, vomiting, chest pain, fever, shortness of breath, abdominal pain, constipation, diarrhea. seems irritated on interview, oriented to date and name, not place. asking for apple slices and a sandwich.  transferred to [facility] to r/o infection, lithium toxicity.     In ED: patient afebrile, tachycardic up to 120s, 100% on RA, /82   Given 1G ceftriaxone, 2L NS, 2mg IV ativan, Potassium repletion  56 year old female w/ PMHx of hyperlipidemia (pt does not want statin per chart), obesity, HTN non compliant to meds/BP measurements, Vit D def, hypothyroidism, schizophrenia, from [facility] where she was in isolation from 1/3 -1/13 for COVID-19 isolation, has decreased PO intake and vomiting x2 on 1/16 and decreased energy. Per [facility] paperwork, patient had clozapine decreased from 200mg daily to 100mg daily and lithium on hold for 1/18/22 for concern of lithium toxicity. Recent bloodwork from 1/3/22 revealed 766 clozapine level (normal 300-700), WBC 7.7, Hgn 14.4, SCr .6, Na 130, K 3.7. Lithium level 0.32.     Patient seen and examined at bedside, poor historian. denying all ROS complaints including nausea, vomiting, chest pain, fever, shortness of breath, abdominal pain, constipation, diarrhea. seems irritated on interview, oriented to date and name, not place. asking for apple slices and a sandwich.  transferred to [facility] to r/o infection, lithium toxicity.     In ED: patient afebrile, tachycardic up to 120s, 100% on RA, /82   Given 1G ceftriaxone, 2L NS, 2mg IV ativan, Potassium repletion

## 2022-01-19 NOTE — PROGRESS NOTE ADULT - PROBLEM SELECTOR PLAN 1
Leukocytosis with tachycardia, source of infection likely urine with UA showing mod leuk esterase with WBC and bacteria   -s/p 2L IVF in ED   -s/p 1G ceftriaxone in ED  -UCx pending, Procal pending, BCx pending  -continue with IVF hydration, monitor WBC and fever Leukocytosis with tachycardia, source of infection likely urine with UA showing mod leuk esterase with WBC and bacteria   -s/p 2L IVF in ED   -s/p 1G ceftriaxone in ED  -UCx results pending, Procal .1, BCx results pending  -now appears euvolemic after resuscitation currently w/o nausea and vomiting   - c/w ceftriaxone for UTI    - will hold off on further IVF   - DASH diet

## 2022-01-19 NOTE — PROGRESS NOTE ADULT - PROBLEM SELECTOR PLAN 3
-likely i/s/o vomiting and dehydration   -s/p K+ repletion, will recheck BMP and replete as necessary -likely i/s/o vomiting and dehydration   - repleting, trend BMP BID

## 2022-01-19 NOTE — BH CONSULTATION LIAISON ASSESSMENT NOTE - DESCRIPTION
Patient is a long-term resident of [facility], single, unemployed. No known substance use or smoking hx.

## 2022-01-19 NOTE — PROGRESS NOTE ADULT - PROBLEM SELECTOR PLAN 2
-in setting of metabolic derangements   -repeat EKG at 12 to check QTc   -psych eval in AM to see which medications to restart -in setting of metabolic derangements   -will re-peat EKG for QT monitoring now that electrolytes repleted  - holding clozapine tonight, if QTC <500 can restart clozapine at 25mg QHS tomorrow   - continue with lithium 600mg QD   - repeat lithium level tomorrow prior to dose  - continue with lamotrigine 100mg QD

## 2022-01-19 NOTE — PROGRESS NOTE ADULT - ASSESSMENT
56 year old female with hx of schizophrenia, hypothyroidism, coming in from [facility] for decreased PO intake, Nausea and vomiting, concern for lithium toxicity but ultimately ruled out, found to have leukocytosis and positive UA concerning for UTI and metabolic derangements 2/2 dehydration and vomiting.

## 2022-01-19 NOTE — H&P ADULT - NSHPPHYSICALEXAM_GEN_ALL_CORE
PHYSICAL EXAM:  Vital Signs Last 24 Hrs  T(C): 36.3 (01-19-22 @ 00:57)  T(F): 97.4 (01-19-22 @ 00:57), Max: 99.4 (01-18-22 @ 19:50)  HR: 94 (01-19-22 @ 03:27) (94 - 123)  BP: 132/82 (01-19-22 @ 03:27)  BP(mean): 94 (01-19-22 @ 03:27) (94 - 94)  RR: 12 (01-19-22 @ 03:27) (12 - 19)  SpO2: 100% (01-19-22 @ 03:27) (99% - 100%)  Wt(kg): --    Constitutional: NAD, awake and alert, irritable   EYES: EOMI  ENT:  Normal Hearing, dry tongue/mucous membranes   Neck: Soft and supple , no thyromegaly   Respiratory: Breath sounds are clear bilaterally, No wheezing, rales or rhonchi  Cardiovascular: S1 and S2, regular rate and rhythm, no Murmurs, gallops or rubs, no JVD,    Gastrointestinal: Bowel Sounds present, soft, nontender, nondistended, no guarding, no rebound  Extremities: No cyanosis or clubbing; warm to touch  Vascular: 2+ peripheral pulses lower ex  Neurological: No focal deficits, sensation to light touch intact in all extremities,  Pupils are equally reactive to light and symmetrical in size.   Musculoskeletal: moving all 4 extremities spontaneously   Skin: No rashes  Psych: no depression or anhedonia, AAOx2  HEME: no bruises, no nose bleeds

## 2022-01-19 NOTE — PROGRESS NOTE ADULT - PROBLEM SELECTOR PLAN 4
-Serum Ca 11, serum phos is low, suggesting possibly a PTH mediated process   -hypercalcemia work up with PTH, Vitamin D level, TSH   -patient received IVF , will continue at low rate of 100 cc/hr of normal saline   -Serum calcium now 9.0  -currently patient is denying complaints but possibly could have contributed to patients N/V at [place]  -trend Ca on next CMP -Serum Ca 11, serum phos is low, suggesting possibly a PTH mediated process   -hypercalcemia work up with PTH, Vitamin D level, TSH (.45)  -patient received IVF , ca now normalized  -Serum calcium now 9.7  - encourage po hydration  - hold off on further IVF   -currently patient is denying complaints but possibly could have contributed to patients N/V at [place]  -trend Ca

## 2022-01-19 NOTE — ED ADULT NURSE REASSESSMENT NOTE - NS ED NURSE REASSESS COMMENT FT1
report received from overnight RN. SAYRA. pt denies any complaints at this time. respirations are even and un labored. skin intact. safety precautions maintained.
Report received from baldemar RN. Patient A&Ox2, resting in stretcher respirations even and unlabored. Patient repositioned per request, blanket provided for comfort. Patient denies any complaints at this time. Stretcher in lowest position, wheels locked, appropriate side rails in place, [place] staff member at bedside. Vitals stable.

## 2022-01-19 NOTE — H&P ADULT - PROBLEM SELECTOR PLAN 3
-Serum Ca 11, serum phos is low, suggesting possibly a PTH mediated process   -hypercalcemia work up with PTH, Vitamin D level, TSH   -patient received IVF , will continue at low rate of 100 cc/hr of normal saline   -currently patient is denying complaints but possibly could have contributed to patients N/V at [facility]   -trend Ca on next CMP -Serum Ca 11, serum phos is low, suggesting possibly a PTH mediated process   -hypercalcemia work up with PTH, Vitamin D level, TSH   -patient received IVF , will continue at low rate of 100 cc/hr of normal saline   -Serum calcium now 9.0  -currently patient is denying complaints but possibly could have contributed to patients N/V at [facility]   -trend Ca on next CMP -likely i/s/o vomiting and dehydration   -s/p K+ repletion, will recheck BMP and replete as necessary

## 2022-01-19 NOTE — BH CONSULTATION LIAISON ASSESSMENT NOTE - RISK ASSESSMENT
Patient is presenting with moderate acute risk of dangerous behavior given irritability and agitation, as well as chronic hx of schizophrenia with recent medication dose decreases. At elevated chronic risk given long history of schizophrenia on clozapine. Protective factors include long-term residence at [facility], where she is monitored and stabilized on a secure unit. She also receives full-time care by aide from [place]. Given current irritability on exam, pt is at moderate risk of harm to others. However, she denies any SIIP or HIIP, AVH, delusions, or intent to harm self or others.

## 2022-01-19 NOTE — PROGRESS NOTE ADULT - PROBLEM SELECTOR PLAN 6
-i/s/o recent vomiting   -UA w/ large ketones likely pointing towards a starvation ketosis   -will continue IVF hydration with 100cc/hr LR  -monitor Scr, lytes -i/s/o recent vomiting   -UA w/ large ketones likely pointing towards a starvation ketosis   - anion gap now closed  - encourage po intake  - d'cd IVF   -monitor Scr, lytes

## 2022-01-19 NOTE — H&P ADULT - PROBLEM SELECTOR PLAN 5
-on lamotrigine, lithium, clozapine   -lithium level 1.1 so lithium toxicity is ruled out   [facility]; day team to reach out to patients psychiatrist to discuss medication regimen and what doses to start -repeat VBG in AM

## 2022-01-19 NOTE — BH CONSULTATION LIAISON ASSESSMENT NOTE - NSICDXBHSECONDARYDX_PSY_ALL_CORE
Sepsis   A41.9  Hypokalemia   E87.6  Schizophrenia   F20.9  Dehydration   E86.0  Metabolic acidosis   E87.2  Prophylactic measure   Z29.9  Hypercalcemia   E83.52  Sepsis secondary to UTI   A41.9  Prolonged QT interval   R94.31  Metabolic acidosis   E87.2

## 2022-01-19 NOTE — H&P ADULT - PROBLEM SELECTOR PLAN 6
-lovenox for dvt ppx   -aspiration precautions   -dispo back to [facility] -i/s/o recent vomiting   -UA w/ large ketones likely pointing towards a starvation ketosis   -will contiinue IVF hydration with 100cc/hr LR  -monitor Scr, lytes -i/s/o recent vomiting   -UA w/ large ketones likely pointing towards a starvation ketosis   -will continue IVF hydration with 100cc/hr LR  -monitor Scr, lytes

## 2022-01-19 NOTE — PATIENT PROFILE ADULT - FALL HARM RISK - HARM RISK INTERVENTIONS
Assistance OOB with selected safe patient handling equipment/Communicate Risk of Fall with Harm to all staff/Monitor for mental status changes/Move patient closer to nurses' station/Reinforce activity limits and safety measures with patient and family/Reorient to person, place and time as needed/Tailored Fall Risk Interventions/Toileting schedule using arm’s reach rule for commode and bathroom/Use of alarms - bed, chair and/or voice tab/Visual Cue: Yellow wristband and red socks/Bed in lowest position, wheels locked, appropriate side rails in place/Call bell, personal items and telephone in reach/Instruct patient to call for assistance before getting out of bed or chair/Non-slip footwear when patient is out of bed/[place] to call system/Physically safe environment - no spills, clutter or unnecessary equipment/Purposeful Proactive Rounding/Room/bathroom lighting operational, light cord in reach

## 2022-01-19 NOTE — CHART NOTE - NSCHARTNOTEFT_GEN_A_CORE
Psych recs, full note to follow:    Unclear if patient had vomited or received her clozapine last night  Given prolonged QTC would HOLD Clozapine tonight  If QTc is <500 tomorrow then OK to restart Clozapine at 25mg qHS (would NOT restart at full dose)  Continue Lithium 600mg QD  Repeat Lithium level at trough immediately PRIOR to AM dose  Continue Lamotrigine 100mg QD    PRN for agitation Zyprexa 5mg q6h PRN PO/IM  Cannot leave AMA  Would c/w 1:1 as patient is agitated, combative at baseline per [facility] staff Psych recs, full note to follow.    Patient with leukocytosis, though severity of this is unlikely to be explained via lithium related demargination. Currently being treated for UTI. Patient irritable, childish on exam.     Would f/u CBC with ANC regularly   Unclear if patient had vomited or received her clozapine last night  Given prolonged QTC would HOLD Clozapine tonight  If QTc is <500 tomorrow then OK to restart Clozapine at 25mg qHS (would NOT restart at full dose)  Continue Lithium 600mg QD  Repeat Lithium level at trough immediately PRIOR to AM dose  Continue Lamotrigine 100mg QD    PRN for agitation Zyprexa 5mg q6h PRN PO/IM  Cannot leave AMA  Would c/w 1:1 as patient is agitated, combative at baseline per [facility] staff

## 2022-01-19 NOTE — H&P ADULT - PROBLEM SELECTOR PLAN 4
-i/s/o recent vomiting   -will contiinue IVF hydration with 100cc/hr NS  -monitor Scr, lytes -i/s/o recent vomiting   -UA w/ large ketones likely pointing towards a starvation ketosis   -will contiinue IVF hydration with 100cc/hr NS  -monitor Scr, lytes -Serum Ca 11, serum phos is low, suggesting possibly a PTH mediated process   -hypercalcemia work up with PTH, Vitamin D level, TSH   -patient received IVF , will continue at low rate of 100 cc/hr of normal saline   -Serum calcium now 9.0  -currently patient is denying complaints but possibly could have contributed to patients N/V at [facility]   -trend Ca on next CMP -Serum Ca 11, serum phos is low, suggesting possibly a PTH mediated process   -hypercalcemia work up with PTH, Vitamin D level, TSH   -patient received IVF , will continue at low rate of 100 cc/hr of normal saline   -Serum calcium now 9.0  -currently patient is denying complaints but possibly could have contributed to patients N/V at [place]  -trend Ca on next CMP

## 2022-01-19 NOTE — BH CONSULTATION LIAISON ASSESSMENT NOTE - SUMMARY
Pt is a 55 yo female with PPHx of schizophrenia, PMHx of hypothyroidism, hyperlipidemia, HTN, obesity transferred from [facility] for decreased PO intake, nausea and vomiting, concern for lithium toxicity, found to have leukocytosis and positive UA concerning for UTI and metabolic derangements 2/2 dehydration and vomiting, seen by [facility] for medication management due to cessation of lithium yesterday and decrease of clozapine from 200 mg daily to 100 mg daily.

## 2022-01-20 LAB
ANION GAP SERPL CALC-SCNC: 12 MMOL/L — SIGNIFICANT CHANGE UP (ref 7–14)
BUN SERPL-MCNC: 12 MG/DL — SIGNIFICANT CHANGE UP (ref 7–23)
CALCIUM SERPL-MCNC: 9.4 MG/DL — SIGNIFICANT CHANGE UP (ref 8.4–10.5)
CHLORIDE SERPL-SCNC: 111 MMOL/L — HIGH (ref 98–107)
CO2 SERPL-SCNC: 19 MMOL/L — LOW (ref 22–31)
CREAT SERPL-MCNC: 0.65 MG/DL — SIGNIFICANT CHANGE UP (ref 0.5–1.3)
GLUCOSE SERPL-MCNC: 132 MG/DL — HIGH (ref 70–99)
HCT VFR BLD CALC: 38.9 % — SIGNIFICANT CHANGE UP (ref 34.5–45)
HCV AB S/CO SERPL IA: 0.12 S/CO — SIGNIFICANT CHANGE UP (ref 0–0.99)
HCV AB SERPL-IMP: SIGNIFICANT CHANGE UP
HGB BLD-MCNC: 13.4 G/DL — SIGNIFICANT CHANGE UP (ref 11.5–15.5)
MAGNESIUM SERPL-MCNC: 2.3 MG/DL — SIGNIFICANT CHANGE UP (ref 1.6–2.6)
MCHC RBC-ENTMCNC: 28.7 PG — SIGNIFICANT CHANGE UP (ref 27–34)
MCHC RBC-ENTMCNC: 34.4 GM/DL — SIGNIFICANT CHANGE UP (ref 32–36)
MCV RBC AUTO: 83.3 FL — SIGNIFICANT CHANGE UP (ref 80–100)
NRBC # BLD: 0 /100 WBCS — SIGNIFICANT CHANGE UP
NRBC # FLD: 0 K/UL — SIGNIFICANT CHANGE UP
PHOSPHATE SERPL-MCNC: 2.9 MG/DL — SIGNIFICANT CHANGE UP (ref 2.5–4.5)
PLATELET # BLD AUTO: 287 K/UL — SIGNIFICANT CHANGE UP (ref 150–400)
POTASSIUM SERPL-MCNC: 2.9 MMOL/L — CRITICAL LOW (ref 3.5–5.3)
POTASSIUM SERPL-SCNC: 2.9 MMOL/L — CRITICAL LOW (ref 3.5–5.3)
PTH-INTACT FLD-MCNC: 50 PG/ML — SIGNIFICANT CHANGE UP (ref 15–65)
RBC # BLD: 4.67 M/UL — SIGNIFICANT CHANGE UP (ref 3.8–5.2)
RBC # FLD: 15.1 % — HIGH (ref 10.3–14.5)
SODIUM SERPL-SCNC: 142 MMOL/L — SIGNIFICANT CHANGE UP (ref 135–145)
VIT D25+D1,25 OH+D1,25 PNL SERPL-MCNC: 51.2 PG/ML — SIGNIFICANT CHANGE UP (ref 19.9–79.3)
WBC # BLD: 8.57 K/UL — SIGNIFICANT CHANGE UP (ref 3.8–10.5)
WBC # FLD AUTO: 8.57 K/UL — SIGNIFICANT CHANGE UP (ref 3.8–10.5)

## 2022-01-20 PROCEDURE — 99232 SBSQ HOSP IP/OBS MODERATE 35: CPT

## 2022-01-20 RX ORDER — CLOZAPINE 150 MG/1
25 TABLET, ORALLY DISINTEGRATING ORAL AT BEDTIME
Refills: 0 | Status: DISCONTINUED | OUTPATIENT
Start: 2022-01-20 | End: 2022-01-24

## 2022-01-20 RX ORDER — POTASSIUM CHLORIDE 20 MEQ
40 PACKET (EA) ORAL EVERY 4 HOURS
Refills: 0 | Status: COMPLETED | OUTPATIENT
Start: 2022-01-20 | End: 2022-01-20

## 2022-01-20 RX ORDER — POTASSIUM CHLORIDE 20 MEQ
10 PACKET (EA) ORAL
Refills: 0 | Status: DISCONTINUED | OUTPATIENT
Start: 2022-01-20 | End: 2022-01-20

## 2022-01-20 RX ADMIN — Medication 100 MILLIEQUIVALENT(S): at 11:43

## 2022-01-20 RX ADMIN — LITHIUM CARBONATE 600 MILLIGRAM(S): 300 TABLET, EXTENDED RELEASE ORAL at 11:42

## 2022-01-20 RX ADMIN — SODIUM CHLORIDE 30 MILLILITER(S): 9 INJECTION INTRAMUSCULAR; INTRAVENOUS; SUBCUTANEOUS at 04:28

## 2022-01-20 RX ADMIN — LAMOTRIGINE 100 MILLIGRAM(S): 25 TABLET, ORALLY DISINTEGRATING ORAL at 11:42

## 2022-01-20 RX ADMIN — Medication 40 MILLIEQUIVALENT(S): at 11:42

## 2022-01-20 RX ADMIN — ENOXAPARIN SODIUM 40 MILLIGRAM(S): 100 INJECTION SUBCUTANEOUS at 11:42

## 2022-01-20 RX ADMIN — Medication 25 MICROGRAM(S): at 06:03

## 2022-01-20 RX ADMIN — CEFTRIAXONE 100 MILLIGRAM(S): 500 INJECTION, POWDER, FOR SOLUTION INTRAMUSCULAR; INTRAVENOUS at 02:16

## 2022-01-20 RX ADMIN — Medication 40 MILLIEQUIVALENT(S): at 16:53

## 2022-01-20 NOTE — BH CONSULTATION LIAISON PROGRESS NOTE - NSBHFUPINTERVALHXFT_PSY_A_CORE
Patient seen, chart reviewed, case discussed with team. Overnight, pt found to be hypokalemic to 2.9. Primary team ordered potassium chloride infusion and powder. Pt is refusing EKG, because "I just don't want it." On assessment, pt was calm and resting with eyes closed. Cooperative on exam. Pt reports "fine" mood. Pt denies any physical discomfort, nausea/vomiting, abdominal pain, shortness of breath, headache or dizziness. Pt does not remember vomiting on 1/16/22 at [place], not oriented to situation. She reports eating and sleeping well overnight. Denies SIIP/HIIP, AVH, paranoia, or delusions. States that she feels safe on the unit. Currently stable VSS, afebrile, satting at 100% on RA.

## 2022-01-20 NOTE — PROGRESS NOTE ADULT - SUBJECTIVE AND OBJECTIVE BOX
Medicine Progress Note    Patient is a 56y old  Female who presents with a chief complaint of Decreased PO intake, vomiting (2022 17:57)      SUBJECTIVE / OVERNIGHT EVENTS:  patient refused to eat breakfast  hypokalemia due to poor dietary intake      ADDITIONAL REVIEW OF SYSTEMS:    MEDICATIONS  (STANDING):  cefTRIAXone   IVPB 1000 milliGRAM(s) IV Intermittent every 24 hours  enoxaparin Injectable 40 milliGRAM(s) SubCutaneous daily  lamoTRIgine 100 milliGRAM(s) Oral daily  levothyroxine 25 MICROGram(s) Oral daily  lithium 600 milliGRAM(s) Oral daily  polyethylene glycol 3350 17 Gram(s) Oral daily  potassium chloride   Powder 40 milliEquivalent(s) Oral every 4 hours  senna 2 Tablet(s) Oral at bedtime  sodium chloride 0.9%. 1000 milliLiter(s) (30 mL/Hr) IV Continuous <Continuous>    MEDICATIONS  (PRN):  acetaminophen     Tablet .. 650 milliGRAM(s) Oral every 6 hours PRN Temp greater or equal to 38C (100.4F), Mild Pain (1 - 3)  aluminum hydroxide/magnesium hydroxide/simethicone Suspension 30 milliLiter(s) Oral every 4 hours PRN Dyspepsia  melatonin 3 milliGRAM(s) Oral at bedtime PRN Insomnia    CAPILLARY BLOOD GLUCOSE        I&O's Summary    2022 07:01  -  2022 07:00  --------------------------------------------------------  IN: 700 mL / OUT: 700 mL / NET: 0 mL        PHYSICAL EXAM:  Vital Signs Last 24 Hrs  T(C): 36.9 (2022 05:45), Max: 36.9 (2022 05:45)  T(F): 98.4 (2022 05:45), Max: 98.4 (2022 05:45)  HR: 91 (2022 05:45) (88 - 100)  BP: 132/71 (2022 05:45) (131/83 - 148/90)  BP(mean): --  RR: 18 (2022 05:45) (15 - 18)  SpO2: 98% (2022 05:45) (97% - 100%)  CONSTITUTIONAL: NAD,   RESPIRATORY: Normal respiratory effort; lungs are diminished  to auscultation bilaterally  CARDIOVASCULAR: Regular rate and rhythm, normal S1 and S2,  No lower extremity edema;  ABDOMEN: Nontender to palpation, normoactive bowel sounds, no rebound/guarding;   PSYCH: A+O to person, place, and time; affect appropriate  NEUROLOGY: CN 2-12 are intact and symmetric; no gross sensory deficits   SKIN: No rashes; no palpable lesions    LABS:                        13.4   8.57  )-----------( 287      ( 2022 06:59 )             38.9     01-20    142  |  111<H>  |  12  ----------------------------<  132<H>  2.9<LL>   |  19<L>  |  0.65    Ca    9.4      2022 06:59  Phos  2.9       Mg     2.30         TPro  7.0  /  Alb  4.5  /  TBili  0.4  /  DBili  x   /  AST  12  /  ALT  11  /  AlkPhos  139<H>  -18          Urinalysis Basic - ( 2022 20:41 )    Color: Yellow / Appearance: Slightly Turbid / S.026 / pH: x  Gluc: x / Ketone: Large  / Bili: Moderate / Urobili: 6 mg/dL   Blood: x / Protein: 100 mg/dL / Nitrite: Negative   Leuk Esterase: Moderate / RBC: 8 /HPF / WBC 14 /HPF   Sq Epi: x / Non Sq Epi: 13 /HPF / Bacteria: Few        Culture - Blood (collected 2022 08:33)  Source: .Blood Blood-Venous  Preliminary Report (2022 09:01):    No growth to date.    Culture - Blood (collected 2022 08:33)  Source: .Blood Blood-Peripheral  Preliminary Report (2022 09:01):    No growth to date.    Culture - Urine (collected 2022 21:39)  Source: Clean Catch Clean Catch (Midstream)  Final Report (2022 20:19):    >=3 organisms. Probable collection contamination.      COVID-19 PCR: NotDetec (2022 22:56)        COORDINATION OF CARE:  Care Discussed with ACP

## 2022-01-20 NOTE — BH CONSULTATION LIAISON PROGRESS NOTE - NSBHASSESSMENTFT_PSY_ALL_CORE
Patient is a 55 yo female with PPHx of schizophrenia, PMHx of hypothyroidism, HLD, HTN, vit D def, and obesity transferred from [facility] for decreased PO intake, nausea and vomiting, concern for lithium toxicity, found to have metabolic derangements (elevated serum glucose with high anion gap metabolic acidosis concerning for clozapine-induced DKA),  leukocytosis and positive UA concerning for UTI, and electrolyte imbalance 2/2 dehydration and vomiting. Seen by C/L Psych for medication management. Per [facility] paperwork, patient had lithium on hold 1/18/22 for concern of lithium toxicity and decrease of clozapine from 200 mg QD to 100 mg QD. Unclear if patient had vomited or received her clozapine prior to presentation at [facility] ED. Recent bloodwork from 1/3/22 showed 766 clozapine level (normal 300-700) and lithium level 0.32. Lithium level 1/18/22 was 1.1.     On assessment, pt was calm and cooperative, responsive to all questions, states that she feels well. She refused repeat ECG this AM. Denies SIIP/HIIP, AVH, or delusions. VSS stable and afebrile, denies any physical discomfort, nausea/vomiting, or abdominal pain.     PLAN:  - Would c/w 1:1 as patient is agitated, combative at baseline per [facility] staff.  - Would f/u CBC with ANC regularly   - Given prolonged QTC would HOLD Clozapine until repeat ECG able to be obtained. If QTc is <500, then OK to restart Clozapine at 25mg qHS (would NOT restart at full dose). Can titrate clozapine up to 100mg over 4 days (increase by 25mg per day).   - Continue Lithium 600mg QD  - Repeat Lithium level at trough immediately PRIOR to AM dose  - Continue Lamotrigine 100mg QD  - PRN for agitation Zyprexa 5mg q6h PRN PO/IM  - Cannot leave AMA   Patient is a 57 yo female with PPHx of schizophrenia, PMHx of hypothyroidism, HLD, HTN, vit D def, and obesity transferred from [facility] for decreased PO intake, nausea and vomiting, concern for lithium toxicity, found to have metabolic derangements (elevated serum glucose with high anion gap metabolic acidosis concerning for clozapine-induced DKA), leukocytosis and positive UA concerning for UTI, and electrolyte imbalance 2/2 dehydration and vomiting. Seen by C/L Psych for medication management.     Per [facility] paperwork, patient had lithium on hold 1/18/22 for concern of lithium toxicity and decrease of clozapine from 200 mg QD to 100 mg QD. Unclear if patient had vomited or received her clozapine prior to presentation at [facility] ED. Recent bloodwork from 1/3/22 showed 766 clozapine level (normal 300-700) and lithium level 0.32. Lithium level 1/18/22 was 1.1.     1/20: On assessment, pt was calm and cooperative, responsive to all questions, states that she feels well. She refused repeat ECG this AM. Denies SIIP/HIIP, AVH, or delusions. VSS stable and afebrile, denies any physical discomfort, nausea/vomiting, or abdominal pain.     PLAN:  - Would c/w 1:1 as patient is agitated, combative at baseline per [facility] staff.  - Would f/u CBC with ANC regularly   - Given prolonged QTC would HOLD Clozapine until repeat ECG able to be obtained. If QTc is <500, then OK to restart Clozapine at 25mg qHS (would NOT restart at full dose). Can titrate clozapine up to 100mg over 4 days (increase by 25mg per day).   - Continue Lithium 600mg QD  - Repeat Lithium level at trough immediately PRIOR to AM dose  - Continue Lamotrigine 100mg QD  - PRN for agitation Zyprexa 5mg q6h PRN PO/IM  - Cannot leave AMA

## 2022-01-20 NOTE — PROGRESS NOTE ADULT - PROBLEM SELECTOR PLAN 2
-in setting of metabolic derangements   - still hypokalemia, correct   -will re-peat EKG for QT monitoring if allows   - holding clozapine tonight, if QTC <500 can restart clozapine at 25mg QHS tomorrow   - continue with lithium 600mg QD   - repeat lithium level tomorrow prior to dose  - continue with lamotrigine 100mg QD

## 2022-01-20 NOTE — PROGRESS NOTE ADULT - PROBLEM SELECTOR PLAN 4
-Serum Ca 11, serum phos is low, suggesting possibly a PTH mediated process   -hypercalcemia work up with PTH, Vitamin D level, TSH (.45)  -patient received IVF , ca now normalized  -Serum calcium now 9.7  - encourage po hydration  - hold off on further IVF   -currently patient is denying complaints but possibly could have contributed to patients N/V at [place]  -trend Ca

## 2022-01-20 NOTE — PROGRESS NOTE ADULT - PROBLEM SELECTOR PLAN 6
-i/s/o recent vomiting   -UA w/ large ketones likely pointing towards a starvation ketosis   - anion gap now closed  - encourage po intake  - d'cd IVF   -monitor Scr, lytes

## 2022-01-20 NOTE — PROGRESS NOTE ADULT - PROBLEM SELECTOR PLAN 7
-on lamotrigine, lithium, clozapine   -lithium level 1.1 so lithium toxicity unlikely   - BH consulted regimen as above  - cannot leave AMA

## 2022-01-20 NOTE — PROGRESS NOTE ADULT - PROBLEM SELECTOR PLAN 1
Leukocytosis with tachycardia, source of infection likely urine with UA showing mod leuk esterase with WBC and bacteria   -s/p 2L IVF in ED   -s/p 1G ceftriaxone in ED  -UCx results contaminated , Procal .1, BCx negative   -now appears euvolemic after resuscitation currently w/o nausea and vomiting   - c/w ceftriaxone for UTI  , complete 3-5 days   - will hold off on further IVF   - DASH diet, encouraged to eat

## 2022-01-20 NOTE — PROGRESS NOTE ADULT - ASSESSMENT
56 year old female with hx of schizophrenia, hypothyroidism, coming in from [facility] for decreased PO intake, Nausea and vomiting, concern for lithium toxicity but ultimately ruled out, found to have leukocytosis and positive UA concerning for UTI and metabolic derangements 2/2 dehydration and vomiting.

## 2022-01-21 PROCEDURE — 99232 SBSQ HOSP IP/OBS MODERATE 35: CPT

## 2022-01-21 PROCEDURE — 93010 ELECTROCARDIOGRAM REPORT: CPT

## 2022-01-21 NOTE — BH CONSULTATION LIAISON PROGRESS NOTE - NSBHINDICATION_PSY_ALL_CORE
Agitation and combative at baseline per [facility] staff
Agitation and combative at baseline per [facility] staff

## 2022-01-21 NOTE — DIETITIAN INITIAL EVALUATION ADULT. - PROBLEM SELECTOR PLAN 3
-likely i/s/o vomiting and dehydration   -s/p K+ repletion, will recheck BMP and replete as necessary

## 2022-01-21 NOTE — DIETITIAN INITIAL EVALUATION ADULT. - PROBLEM SELECTOR PLAN 7
-on lamotrigine, lithium, clozapine   -lithium level 1.1 so lithium toxicity is ruled out   -patient from [place]; day team to reach out to patients psychiatrist to discuss medication regimen and what doses to start

## 2022-01-21 NOTE — PROGRESS NOTE ADULT - PROBLEM SELECTOR PLAN 4
-Serum Ca 11, serum phos is low, suggesting possibly a PTH mediated process   -hypercalcemia work up with PTH, Vitamin D level, TSH (.45)  -patient received IVF , ca now normalized  -Serum calcium now 9.7  - encourage po hydration  - hold off on further IVF   -currently patient is denying complaints but possibly could have contributed to patients N/V at [place]  -trend Ca

## 2022-01-21 NOTE — DIETITIAN INITIAL EVALUATION ADULT. - PROBLEM SELECTOR PLAN 2
-in setting of metabolic derangements   -repeat EKG at 12 to check QTc   -psych eval in AM to see which medications to restart

## 2022-01-21 NOTE — DIETITIAN INITIAL EVALUATION ADULT. - PROBLEM SELECTOR PLAN 6
-i/s/o recent vomiting   -UA w/ large ketones likely pointing towards a starvation ketosis   -will continue IVF hydration with 100cc/hr LR  -monitor Scr, lytes

## 2022-01-21 NOTE — DIETITIAN INITIAL EVALUATION ADULT. - OTHER INFO
Met with patient and PCA at bedside. Patient w/ very minimal contribution to interview. Did not respond to all questions inquired during interview. States appetite and po intake "ok." Per PCA at bedside, patient did not complete her meal, had about 50% of intake. Per nursing flowsheet, 0-50% of meals recorded since 1/20. No nausea/vomiting/diarrhea/constipation or difficulty chewing and swallowing reported. Patient did not provide weight related history. No weight documented this admission. RN to obtain and record weight in chart. Importance of having good po intake briefly discussed but patient not interested in interview at this time.

## 2022-01-21 NOTE — PROGRESS NOTE ADULT - ASSESSMENT
56 year old female with hx of schizophrenia, hypothyroidism, coming in from [facility] for decreased PO intake, Nausea and vomiting, concern for lithium toxicity but ultimately ruled out, found to have leukocytosis and positive UA concerning for UTI and metabolic derangements 2/2 dehydration and vomiting.

## 2022-01-21 NOTE — PROGRESS NOTE ADULT - PROBLEM SELECTOR PLAN 2
-in setting of metabolic derangements   - still hypokalemia, correct , refusing labs today , determined to not get labs   -will re-peat EKG for QT monitoring if allows   - holding clozapine tonight, if QTC <500 can restart clozapine at 25mg QHS tomorrow   - continue with lithium 600mg QD   - repeat lithium level tomorrow prior to dose  - continue with lamotrigine 100mg QD

## 2022-01-21 NOTE — DIETITIAN INITIAL EVALUATION ADULT. - CHIEF COMPLAINT
55 y/o Female with medical history of Schizophrenia and Hypothyroidism who presented from [facility] for decreased PO intake and N/V with concern for lithium toxicity but r/o, found to have UTI and metabolic derangement due to dehydration and vomiting per chart review.

## 2022-01-21 NOTE — DIETITIAN INITIAL EVALUATION ADULT. - ORAL NUTRITION SUPPLEMENTS
Recommend add PO supplement Ensure Enlive 240mls 2x daily (700kcal, 40g protein) for added calories and protein.

## 2022-01-21 NOTE — DIETITIAN INITIAL EVALUATION ADULT. - CONTINUE CURRENT NUTRITION CARE PLAN
Recommend liberalizing diet to Regular and D/C DASH/TLC (cholesterol and sodium restricted) diet to maximize menu options and po intake.

## 2022-01-21 NOTE — PROGRESS NOTE ADULT - SUBJECTIVE AND OBJECTIVE BOX
[facility] Division of Hospital Medicine  Savana Kaufman MD  Pager (M-F, [phone]-5P): [phone]      Patient is a 56y old  Female who presents with a chief complaint of Decreased PO intake, vomiting (21 Jan 2022 12:17)      SUBJECTIVE / OVERNIGHT EVENTS: no events   Patient is refusing labs     MEDICATIONS  (STANDING):  cefTRIAXone   IVPB 1000 milliGRAM(s) IV Intermittent every 24 hours  cloZAPine 25 milliGRAM(s) Oral at bedtime  enoxaparin Injectable 40 milliGRAM(s) SubCutaneous daily  lamoTRIgine 100 milliGRAM(s) Oral daily  levothyroxine 25 MICROGram(s) Oral daily  lithium 600 milliGRAM(s) Oral daily  polyethylene glycol 3350 17 Gram(s) Oral daily  senna 2 Tablet(s) Oral at bedtime  sodium chloride 0.9%. 1000 milliLiter(s) (30 mL/Hr) IV Continuous <Continuous>    MEDICATIONS  (PRN):  acetaminophen     Tablet .. 650 milliGRAM(s) Oral every 6 hours PRN Temp greater or equal to 38C (100.4F), Mild Pain (1 - 3)  aluminum hydroxide/magnesium hydroxide/simethicone Suspension 30 milliLiter(s) Oral every 4 hours PRN Dyspepsia  melatonin 3 milliGRAM(s) Oral at bedtime PRN Insomnia      CAPILLARY BLOOD GLUCOSE        I&O's Summary    20 Jan 2022 07:01  -  21 Jan 2022 07:00  --------------------------------------------------------  IN: 1116 mL / OUT: 900 mL / NET: 216 mL    21 Jan 2022 07:01  -  21 Jan 2022 13:24  --------------------------------------------------------  IN: 480 mL / OUT: 0 mL / NET: 480 mL        PHYSICAL EXAM:  Vital Signs Last 24 Hrs  T(C): --  T(F): --  HR: --  BP: --  BP(mean): --  RR: --  SpO2: --  CONSTITUTIONAL: NAD,  EYES: EOMI; conjunctiva and sclera clear  NECK: Supple,  RESPIRATORY: Normal respiratory effort; lungs are diminished to auscultation bilaterally  CARDIOVASCULAR: Regular rate and rhythm, normal S1 and S2, no murmur/rub/gallop; No lower extremity edema;  ABDOMEN: Nontender to palpation, normoactive bowel sounds, no rebound/guarding; No hepatosplenomegaly  MUSKULOSKELETAL:  no clubbing or cyanosis of digits; no joint swelling or tenderness to palpation  PSYCH: A+O to person, refusing care   NEUROLOGY:  grossly non focal   LABS:                        13.4   8.57  )-----------( 287      ( 20 Jan 2022 06:59 )             38.9     01-20    142  |  111<H>  |  12  ----------------------------<  132<H>  2.9<LL>   |  19<L>  |  0.65    Ca    9.4      20 Jan 2022 06:59  Phos  2.9     01-20  Mg     2.30     01-20                Culture - Blood (collected 19 Jan 2022 08:33)  Source: .Blood Blood-Venous  Preliminary Report (20 Jan 2022 09:01):    No growth to date.    Culture - Blood (collected 19 Jan 2022 08:33)  Source: .Blood Blood-Peripheral  Preliminary Report (20 Jan 2022 09:01):    No growth to date.    Culture - Urine (collected 18 Jan 2022 21:39)  Source: Clean Catch Clean Catch (Midstream)  Final Report (19 Jan 2022 20:19):    >=3 organisms. Probable collection contamination.        RADIOLOGY & ADDITIONAL TESTS:  Results Reviewed:   Imaging Personally Reviewed:  Electrocardiogram Personally Reviewed:    COORDINATION OF CARE:  Care Discussed with Consultants/Other Providers [Y/N]:  Prior or Outpatient Records Reviewed [Y/N]:

## 2022-01-21 NOTE — DIETITIAN INITIAL EVALUATION ADULT. - PROBLEM SELECTOR PLAN 8
-lovenox for dvt ppx   -Diet: on low salt, low cholesterol diet at [facility], tolerated diet in ED as per RN  -aspiration precautions, fall precautions, ambulate with assistance  -dispo back to [facility]

## 2022-01-21 NOTE — DIETITIAN INITIAL EVALUATION ADULT. - PROBLEM SELECTOR PLAN 1
Leukocytosis with tachycardia, source of infection likely urine with UA showing mod leuk esterase with WBC and bacteria   -s/p 2L IVF in ED   -s/p 1G ceftriaxone in ED  -UCx pending, Procal pending, BCx pending  -continue with IVF hydration, monitor WBC and fever

## 2022-01-21 NOTE — BH CONSULTATION LIAISON PROGRESS NOTE - NSBHFUPINTERVALHXFT_PSY_A_CORE
Patient seen, chart reviewed, case discussed with team. Patient was restarted on clozapine 25 mg given QTc 487 on 1/19; however, pt refused clozapine. Patient refused all vitals, telemetry monitoring, and medications (including clozapine 25 mg and levothyroxine 25 mcg) last night and this morning. Examiner provided education on importance of receiving medications. Pt believes she does not need medications, because she feels "fine." On assessment, pt was calm and resting in bed. Responded to all questions, though childish and slightly irritable when asked why she is refusing medications. Pt reports "fine" mood and states that she is comfortable; pt asks us to "leave [her] alone." Pt denies any physical discomfort, nausea/vomiting, abdominal pain, shortness of breath, headache or dizziness. She reports sleeping well overnight. She ate breakfast this morning. Denies SIIP/HIIP, AVH, paranoia, or delusions. States that she feels safe on the unit. Currently stable, breathing comfortably on RA, in NAD.

## 2022-01-21 NOTE — DIETITIAN INITIAL EVALUATION ADULT. - PERTINENT LABORATORY DATA
01-20 Na142 mmol/L Glu 132 mg/dL<H> K+ 2.9 mmol/L<LL> Cr  0.65 mg/dL BUN 12 mg/dL 01-20 Phos 2.9 mg/dL 01-18 Alb 4.5 g/dL

## 2022-01-21 NOTE — DIETITIAN INITIAL EVALUATION ADULT. - PROBLEM SELECTOR PLAN 4
-Serum Ca 11, serum phos is low, suggesting possibly a PTH mediated process   -hypercalcemia work up with PTH, Vitamin D level, TSH   -patient received IVF , will continue at low rate of 100 cc/hr of normal saline   -Serum calcium now 9.0  -currently patient is denying complaints but possibly could have contributed to patients N/V at [place]  -trend Ca on next CMP

## 2022-01-21 NOTE — BH CONSULTATION LIAISON PROGRESS NOTE - NSBHCONSULTWORKUPLABSFT_PSY_ALL_CORE
F/u metabolic panel (serum glucose, lipids) and CMP (bicarb, K+, Na+) 
F/u metabolic panel (serum glucose, lipids) and CMP (bicarb, K+, Na+)

## 2022-01-22 PROCEDURE — 99232 SBSQ HOSP IP/OBS MODERATE 35: CPT

## 2022-01-22 RX ADMIN — SODIUM CHLORIDE 30 MILLILITER(S): 9 INJECTION INTRAMUSCULAR; INTRAVENOUS; SUBCUTANEOUS at 07:44

## 2022-01-22 RX ADMIN — POLYETHYLENE GLYCOL 3350 17 GRAM(S): 17 POWDER, FOR SOLUTION ORAL at 13:23

## 2022-01-22 RX ADMIN — CEFTRIAXONE 100 MILLIGRAM(S): 500 INJECTION, POWDER, FOR SOLUTION INTRAMUSCULAR; INTRAVENOUS at 06:52

## 2022-01-22 NOTE — PROGRESS NOTE ADULT - ASSESSMENT
56 year old female with hx of schizophrenia, hypothyroidism, coming in from [facility] for decreased PO intake, Nausea and vomiting, concern for lithium toxicity but ultimately ruled out, found to have leukocytosis and positive UA concerning for UTI and metabolic derangements 2/2 dehydration and vomiting.

## 2022-01-22 NOTE — PROGRESS NOTE ADULT - PROBLEM SELECTOR PLAN 4
-Serum Ca 11, serum phos is low, suggesting possibly a PTH mediated process   -hypercalcemia work up with PTH, Vitamin D level, TSH (.45)  -patient received IVF , ca now normalized  -Serum calcium now 9.7  - encourage po hydration  - hold off on further IVF   -currently patient is denying complaints but possibly could have contributed to patients N/V at [place]  -trend Ca

## 2022-01-22 NOTE — PROGRESS NOTE ADULT - SUBJECTIVE AND OBJECTIVE BOX
Medicine Progress Note    Patient is a 56y old  Female who presents with a chief complaint of Decreased PO intake, vomiting (21 Jan 2022 13:24)      SUBJECTIVE / OVERNIGHT EVENTS: patient needs labs, EKG today   Denies any complaints    MEDICATIONS  (STANDING):  cefTRIAXone   IVPB 1000 milliGRAM(s) IV Intermittent every 24 hours  cloZAPine 25 milliGRAM(s) Oral at bedtime  enoxaparin Injectable 40 milliGRAM(s) SubCutaneous daily  lamoTRIgine 100 milliGRAM(s) Oral daily  levothyroxine 25 MICROGram(s) Oral daily  lithium 600 milliGRAM(s) Oral daily  polyethylene glycol 3350 17 Gram(s) Oral daily  senna 2 Tablet(s) Oral at bedtime  sodium chloride 0.9%. 1000 milliLiter(s) (30 mL/Hr) IV Continuous <Continuous>    MEDICATIONS  (PRN):  acetaminophen     Tablet .. 650 milliGRAM(s) Oral every 6 hours PRN Temp greater or equal to 38C (100.4F), Mild Pain (1 - 3)  aluminum hydroxide/magnesium hydroxide/simethicone Suspension 30 milliLiter(s) Oral every 4 hours PRN Dyspepsia  melatonin 3 milliGRAM(s) Oral at bedtime PRN Insomnia    CAPILLARY BLOOD GLUCOSE        I&O's Summary    21 Jan 2022 07:01  -  22 Jan 2022 07:00  --------------------------------------------------------  IN: 720 mL / OUT: 0 mL / NET: 720 mL        PHYSICAL EXAM:  Vital Signs Last 24 Hrs  T(C): 36.7 (22 Jan 2022 12:05), Max: 36.7 (22 Jan 2022 06:30)  T(F): 98 (22 Jan 2022 12:05), Max: 98.1 (22 Jan 2022 06:30)  HR: 85 (22 Jan 2022 12:05) (82 - 85)  BP: 137/92 (22 Jan 2022 12:05) (137/92 - 151/90)  BP(mean): --  RR: 18 (22 Jan 2022 12:05) (18 - 18)  SpO2: 98% (22 Jan 2022 12:05) (98% - 99%)  CONSTITUTIONAL: NAD,   RESPIRATORY: Normal respiratory effort; lungs are clear to auscultation bilaterall  CARDIOVASCULAR: Regular rate and rhythm, normal S1 and S2,  No lower extremity edema;   ABDOMEN: Nontender to palpation, normoactive bowel sounds, no rebound/guarding;  PSYCH: A+O to person.  NEUROLOGY: grossly non-focal   LABS: pending today                     COVID-19 PCR: NotDetec (18 Jan 2022 22:56)      COORDINATION OF CARE:  Care Discussed with PA

## 2022-01-23 LAB
ANION GAP SERPL CALC-SCNC: 11 MMOL/L — SIGNIFICANT CHANGE UP (ref 7–14)
BUN SERPL-MCNC: 6 MG/DL — LOW (ref 7–23)
CALCIUM SERPL-MCNC: 9.4 MG/DL — SIGNIFICANT CHANGE UP (ref 8.4–10.5)
CHLORIDE SERPL-SCNC: 109 MMOL/L — HIGH (ref 98–107)
CO2 SERPL-SCNC: 25 MMOL/L — SIGNIFICANT CHANGE UP (ref 22–31)
CREAT SERPL-MCNC: 0.5 MG/DL — SIGNIFICANT CHANGE UP (ref 0.5–1.3)
GLUCOSE SERPL-MCNC: 121 MG/DL — HIGH (ref 70–99)
HCT VFR BLD CALC: 44 % — SIGNIFICANT CHANGE UP (ref 34.5–45)
HGB BLD-MCNC: 14.1 G/DL — SIGNIFICANT CHANGE UP (ref 11.5–15.5)
LITHIUM SERPL-MCNC: 0.1 MMOL/L — LOW (ref 0.6–1.2)
MAGNESIUM SERPL-MCNC: 2 MG/DL — SIGNIFICANT CHANGE UP (ref 1.6–2.6)
MCHC RBC-ENTMCNC: 28.1 PG — SIGNIFICANT CHANGE UP (ref 27–34)
MCHC RBC-ENTMCNC: 32 GM/DL — SIGNIFICANT CHANGE UP (ref 32–36)
MCV RBC AUTO: 87.6 FL — SIGNIFICANT CHANGE UP (ref 80–100)
NRBC # BLD: 0 /100 WBCS — SIGNIFICANT CHANGE UP
NRBC # FLD: 0 K/UL — SIGNIFICANT CHANGE UP
PHOSPHATE SERPL-MCNC: 2.9 MG/DL — SIGNIFICANT CHANGE UP (ref 2.5–4.5)
PLATELET # BLD AUTO: 292 K/UL — SIGNIFICANT CHANGE UP (ref 150–400)
POTASSIUM SERPL-MCNC: 3.5 MMOL/L — SIGNIFICANT CHANGE UP (ref 3.5–5.3)
POTASSIUM SERPL-SCNC: 3.5 MMOL/L — SIGNIFICANT CHANGE UP (ref 3.5–5.3)
RBC # BLD: 5.02 M/UL — SIGNIFICANT CHANGE UP (ref 3.8–5.2)
RBC # FLD: 15.2 % — HIGH (ref 10.3–14.5)
SODIUM SERPL-SCNC: 145 MMOL/L — SIGNIFICANT CHANGE UP (ref 135–145)
WBC # BLD: 6.3 K/UL — SIGNIFICANT CHANGE UP (ref 3.8–10.5)
WBC # FLD AUTO: 6.3 K/UL — SIGNIFICANT CHANGE UP (ref 3.8–10.5)

## 2022-01-23 PROCEDURE — 99232 SBSQ HOSP IP/OBS MODERATE 35: CPT

## 2022-01-23 RX ORDER — POTASSIUM CHLORIDE 20 MEQ
20 PACKET (EA) ORAL ONCE
Refills: 0 | Status: COMPLETED | OUTPATIENT
Start: 2022-01-23 | End: 2022-01-23

## 2022-01-23 RX ADMIN — CEFTRIAXONE 100 MILLIGRAM(S): 500 INJECTION, POWDER, FOR SOLUTION INTRAMUSCULAR; INTRAVENOUS at 02:55

## 2022-01-23 NOTE — PROGRESS NOTE ADULT - ASSESSMENT
56 year old female with hx of schizophrenia, hypothyroidism, coming in from [facility] for decreased PO intake, Nausea and vomiting, concern for lithium toxicity but ultimately ruled out, found to have leukocytosis and positive UA concerning for UTI and metabolic derangements 2/2 dehydration and vomiting.

## 2022-01-23 NOTE — PROGRESS NOTE ADULT - PROBLEM SELECTOR PLAN 4
-Serum Ca 11, serum phos is low, suggesting possibly a PTH mediated process   -hypercalcemia work up with PTH, Vitamin D level, TSH (.45)  -patient received IVF , ca now normalized  -Serum calcium now 9.7  - encourage po hydration  - hold off on further IVF   -currently patient is denying complaints but possibly could have contributed to patients N/V at [place]  -trend Ca

## 2022-01-23 NOTE — PROGRESS NOTE ADULT - PROBLEM SELECTOR PLAN 2
-in setting of metabolic derangements   - still hypokalemia, correct , give po KCL today   -will re-peat EKG for QT monitoring if allows   - holding clozapine tonight, if QTC <500 can restart clozapine at 25mg QHS tomorrow   - continue with lithium 600mg QD   - repeat lithium level tomorrow prior to dose  - continue with lamotrigine 100mg QD-  refusing , psych follow up

## 2022-01-23 NOTE — PROGRESS NOTE ADULT - PROBLEM SELECTOR PROBLEM 1
Sepsis secondary to UTI

## 2022-01-23 NOTE — PROGRESS NOTE ADULT - SUBJECTIVE AND OBJECTIVE BOX
Medicine Progress Note    Patient is a 56y old  Female who presents with a chief complaint of Decreased PO intake, vomiting (22 Jan 2022 09:42)      SUBJECTIVE / OVERNIGHT EVENTS: patient iis psychotic   Refusing psych meds   Refusing to eat again     MEDICATIONS  (STANDING):  cefTRIAXone   IVPB 1000 milliGRAM(s) IV Intermittent every 24 hours  cloZAPine 25 milliGRAM(s) Oral at bedtime  enoxaparin Injectable 40 milliGRAM(s) SubCutaneous daily  lamoTRIgine 100 milliGRAM(s) Oral daily  levothyroxine 25 MICROGram(s) Oral daily  lithium 600 milliGRAM(s) Oral daily  polyethylene glycol 3350 17 Gram(s) Oral daily  senna 2 Tablet(s) Oral at bedtime  sodium chloride 0.9%. 1000 milliLiter(s) (30 mL/Hr) IV Continuous <Continuous>    MEDICATIONS  (PRN):  acetaminophen     Tablet .. 650 milliGRAM(s) Oral every 6 hours PRN Temp greater or equal to 38C (100.4F), Mild Pain (1 - 3)  aluminum hydroxide/magnesium hydroxide/simethicone Suspension 30 milliLiter(s) Oral every 4 hours PRN Dyspepsia  melatonin 3 milliGRAM(s) Oral at bedtime PRN Insomnia    CAPILLARY BLOOD GLUCOSE        I&O's Summary      PHYSICAL EXAM:  Vital Signs Last 24 Hrs  T(C): 36.3 (23 Jan 2022 05:50), Max: 36.6 (22 Jan 2022 22:05)  T(F): 97.4 (23 Jan 2022 05:50), Max: 97.9 (22 Jan 2022 22:05)  HR: 79 (23 Jan 2022 05:50) (77 - 79)  BP: 154/89 (23 Jan 2022 05:50) (151/95 - 154/89)  BP(mean): --  RR: 17 (23 Jan 2022 05:50) (17 - 18)  SpO2: 99% (23 Jan 2022 05:50) (99% - 100%)  CONSTITUTIONAL: NAD,  RESPIRATORY: Normal respiratory effort; lungs are clear to auscultation bilaterally  CARDIOVASCULAR: Regular rate and rhythm, normal S1 and S2, ; No lower extremity edema;   ABDOMEN: Nontender to palpation, normoactive bowel sounds, no rebound/guarding;   PSYCH: A+O to person,  NEUROLOGY:  grossly non -focal   LABS:                        14.1   6.30  )-----------( 292      ( 23 Jan 2022 06:11 )             44.0     01-23    145  |  109<H>  |  6<L>  ----------------------------<  121<H>  3.5   |  25  |  0.50    Ca    9.4      23 Jan 2022 06:11  Phos  2.9     01-23  Mg     2.00     01-23                COVID-19 PCR: Moe (18 Jan 2022 22:56)        COORDINATION OF CARE:  Care Discussed with ACP

## 2022-01-23 NOTE — PROGRESS NOTE ADULT - PROBLEM SELECTOR PLAN 8
-lovenox for dvt ppx   -Diet: on low salt, low cholesterol diet at [place], tolerated diet in ED as per RN  -aspiration precautions, fall precautions, ambulate with assistance  -dispo per psych
-lovenox for dvt ppx   -Diet: on low salt, low cholesterol diet at [place], tolerated diet in ED as per RN  -aspiration precautions, fall precautions, ambulate with assistance  -dispo back to [place]
-lovenox for dvt ppx   -Diet: on low salt, low cholesterol diet at [place], tolerated diet in ED as per RN  -aspiration precautions, fall precautions, ambulate with assistance  -dispo back to [place]
-lovenox for dvt ppx   -Diet: on low salt, low cholesterol diet at [place], tolerated diet in ED as per RN  -aspiration precautions, fall precautions, ambulate with assistance  -dispo per psych
-lovenox for dvt ppx   -Diet: on low salt, low cholesterol diet at [employer], tolerated diet in ED as per RN  -aspiration precautions, fall precautions, ambulate with assistance  -dispo per psych

## 2022-01-24 LAB
ANION GAP SERPL CALC-SCNC: 11 MMOL/L — SIGNIFICANT CHANGE UP (ref 7–14)
BUN SERPL-MCNC: 4 MG/DL — LOW (ref 7–23)
CALCIUM SERPL-MCNC: 9.3 MG/DL — SIGNIFICANT CHANGE UP (ref 8.4–10.5)
CHLORIDE SERPL-SCNC: 106 MMOL/L — SIGNIFICANT CHANGE UP (ref 98–107)
CO2 SERPL-SCNC: 24 MMOL/L — SIGNIFICANT CHANGE UP (ref 22–31)
CREAT SERPL-MCNC: 0.46 MG/DL — LOW (ref 0.5–1.3)
CULTURE RESULTS: SIGNIFICANT CHANGE UP
CULTURE RESULTS: SIGNIFICANT CHANGE UP
GLUCOSE SERPL-MCNC: 113 MG/DL — HIGH (ref 70–99)
HCT VFR BLD CALC: 42 % — SIGNIFICANT CHANGE UP (ref 34.5–45)
HGB BLD-MCNC: 13.5 G/DL — SIGNIFICANT CHANGE UP (ref 11.5–15.5)
LITHIUM SERPL-MCNC: 0.1 MMOL/L — LOW (ref 0.6–1.2)
MAGNESIUM SERPL-MCNC: 2.1 MG/DL — SIGNIFICANT CHANGE UP (ref 1.6–2.6)
MCHC RBC-ENTMCNC: 28.4 PG — SIGNIFICANT CHANGE UP (ref 27–34)
MCHC RBC-ENTMCNC: 32.1 GM/DL — SIGNIFICANT CHANGE UP (ref 32–36)
MCV RBC AUTO: 88.2 FL — SIGNIFICANT CHANGE UP (ref 80–100)
NRBC # BLD: 0 /100 WBCS — SIGNIFICANT CHANGE UP
NRBC # FLD: 0 K/UL — SIGNIFICANT CHANGE UP
PHOSPHATE SERPL-MCNC: 3.1 MG/DL — SIGNIFICANT CHANGE UP (ref 2.5–4.5)
PLATELET # BLD AUTO: 247 K/UL — SIGNIFICANT CHANGE UP (ref 150–400)
POTASSIUM SERPL-MCNC: 3.2 MMOL/L — LOW (ref 3.5–5.3)
POTASSIUM SERPL-SCNC: 3.2 MMOL/L — LOW (ref 3.5–5.3)
RBC # BLD: 4.76 M/UL — SIGNIFICANT CHANGE UP (ref 3.8–5.2)
RBC # FLD: 14.8 % — HIGH (ref 10.3–14.5)
SODIUM SERPL-SCNC: 141 MMOL/L — SIGNIFICANT CHANGE UP (ref 135–145)
SPECIMEN SOURCE: SIGNIFICANT CHANGE UP
SPECIMEN SOURCE: SIGNIFICANT CHANGE UP
WBC # BLD: 6.22 K/UL — SIGNIFICANT CHANGE UP (ref 3.8–10.5)
WBC # FLD AUTO: 6.22 K/UL — SIGNIFICANT CHANGE UP (ref 3.8–10.5)

## 2022-01-24 PROCEDURE — 99232 SBSQ HOSP IP/OBS MODERATE 35: CPT

## 2022-01-24 PROCEDURE — 99233 SBSQ HOSP IP/OBS HIGH 50: CPT

## 2022-01-24 RX ORDER — OLANZAPINE 15 MG/1
5 TABLET, FILM COATED ORAL EVERY 6 HOURS
Refills: 0 | Status: DISCONTINUED | OUTPATIENT
Start: 2022-01-24 | End: 2022-01-28

## 2022-01-24 RX ORDER — OLANZAPINE 15 MG/1
5 TABLET, FILM COATED ORAL AT BEDTIME
Refills: 0 | Status: DISCONTINUED | OUTPATIENT
Start: 2022-01-24 | End: 2022-01-25

## 2022-01-24 RX ORDER — POTASSIUM CHLORIDE 20 MEQ
40 PACKET (EA) ORAL ONCE
Refills: 0 | Status: COMPLETED | OUTPATIENT
Start: 2022-01-24 | End: 2022-01-24

## 2022-01-24 RX ORDER — OLANZAPINE 15 MG/1
7.5 TABLET, FILM COATED ORAL AT BEDTIME
Refills: 0 | Status: DISCONTINUED | OUTPATIENT
Start: 2022-01-24 | End: 2022-01-24

## 2022-01-24 RX ADMIN — CEFTRIAXONE 100 MILLIGRAM(S): 500 INJECTION, POWDER, FOR SOLUTION INTRAMUSCULAR; INTRAVENOUS at 02:12

## 2022-01-24 NOTE — BH CONSULTATION LIAISON PROGRESS NOTE - NSBHFUPINTERVALHXFT_PSY_A_CORE
Chart reviewed: patient refusing all PO meds (including clozapine/lamictal/lithium) for the last three days. Refusing PO K+ for repletion. No behavioral health PRNs. Case discussed in morning rounds.    Patient seen and examined. Lying in bed glaring suspiciously at interviewers. States she feels "fine" and denies pain, shortness of breath, low mood, AH/VH, SHIIP, and paranoia towards staff. Oriented to self/time/place. Patient reacts irritably to questions regarding psychiatric symptoms, demanding "why are you asking?" Asked why she is refusing medications, states "I am normal, I don't need medication." Chart reviewed: patient refusing all PO meds (including clozapine/lamictal/lithium) for the last three days. Refusing PO K+ for repletion. No behavioral health PRNs. Case discussed in morning rounds.    Patient seen and examined. Lying in bed glaring suspiciously at interviewers. States she feels "fine" and denies pain, shortness of breath, low mood, AH/VH, SHIIP, and paranoia towards staff. Oriented to self/time/place. Patient reacts irritably to questions regarding psychiatric symptoms, demanding "why are you asking?" Asked why she is refusing medications, states "I am normal, I don't need medication." Pt. seems paranoid and  internally preoccupied.

## 2022-01-24 NOTE — PROVIDER CONTACT NOTE (OTHER) - BACKGROUND
Admit Diagnosis) Hypokalemia  (PMH) Hypothyroidism  (PMH) Schizophrenia  (Principal DC/DX) Hypokalemia
Admit Diagnosis) Hypokalemia  (PMH) Hypothyroidism  (PMH) Schizophrenia  (Principal DC/DX) Hypokalemia
Pmhx of schizophrenia and hypothyroidism who presented with decreased PO intake and N/V found to have UTI and metabolic derangements
Admit Diagnosis) Hypokalemia  (PMH) Hypothyroidism  (PMH) Schizophrenia  (Principal DC/DX) Hypokalemia
Pmhx of schizophrenia and hypothyroidism who presented with decreased PO intake and N/V found to have UTI and metabolic derangements
admitted with decreased PO intake, vomiting.
Admit Diagnosis) Hypokalemia  (PMH) Hypothyroidism  (PMH) Schizophrenia  (Principal DC/DX) Hypokalemia

## 2022-01-24 NOTE — PROVIDER CONTACT NOTE (OTHER) - SITUATION
patient continuing to refuse tele
Patient refusing VS, medications, assessment IV fluids, and continous telemetry monitoring.
Patient refusing VS, medications, assessment IV fluids,labs, and continous telemetry monitoring.
Patient refusing repeat EKG
patient continuing to refuse tele, medication, and vital signs
patient continuing to refuse tele, medication, and vital signs
patient continues to refuse all medication,  fluids and removed IV.
patient continues to refuse all medication,  fluids and removed IV. patient also refusing labs, vital signs and EKG management and team ACP aware and attending aware
patient continuing to refuse tele

## 2022-01-24 NOTE — PROGRESS NOTE ADULT - ASSESSMENT
. 57 yo F w/ schizophrenia, hypothyroidism, presenting from [place] for decreased PO intake, N/V, metabolic derangements, admitted w/ sepsis 2/2 UTI, s/p course of antibiotics and IV fluids with clinical improvement, course c/b medication non-compliance.     # Sepsis 2/2 UTI  - +leukocytosis and tachycardia on admission, w/ +UA  - Ucx non-informative, currently afebrile, not tachycardic, no leukocytosis, sepsis resolved  - completed empiric course of CTX (s/p 5 doses already, will d/c abx)    # Schizophrenia c/b medication non-compliance  - psych input appreciated, d/c lamictal, lithium and clozaril as patient non-compliant w/ medications  - c/w zyprexa as per psych (can be given IM if needed)  - no capacity to leave AMA  - anticipate return to [place] once optimized    # Inadequate oral intake  - potentially due psychiatric condition  - nutrition recs appreciated  - diet liberalized, c/w ensure supplementation, encourage PO intake    # Hypothyroidism  - c/w synthroid    # Hypokalemia  - replete K    # Hypercalemia  - resolved s/p IV fluids    VTE ppx: lovenox    Dispo: anticipate return to [place] pending medical optimization, need to ensure adequate PO intake

## 2022-01-24 NOTE — PROGRESS NOTE ADULT - SUBJECTIVE AND OBJECTIVE BOX
[facility] Medicine  Pager [phone]    Patient is a 56y old  Female who presents with a chief complaint of Decreased PO intake, vomiting (23 Jan 2022 12:41)      INTERVAL HPI/OVERNIGHT EVENTS:    MEDICATIONS  (STANDING):  cloZAPine 25 milliGRAM(s) Oral at bedtime  enoxaparin Injectable 40 milliGRAM(s) SubCutaneous daily  levothyroxine 25 MICROGram(s) Oral daily  lithium 600 milliGRAM(s) Oral daily  polyethylene glycol 3350 17 Gram(s) Oral daily  senna 2 Tablet(s) Oral at bedtime  sodium chloride 0.9%. 1000 milliLiter(s) (30 mL/Hr) IV Continuous <Continuous>    MEDICATIONS  (PRN):  acetaminophen     Tablet .. 650 milliGRAM(s) Oral every 6 hours PRN Temp greater or equal to 38C (100.4F), Mild Pain (1 - 3)  aluminum hydroxide/magnesium hydroxide/simethicone Suspension 30 milliLiter(s) Oral every 4 hours PRN Dyspepsia  melatonin 3 milliGRAM(s) Oral at bedtime PRN Insomnia  OLANZapine 5 milliGRAM(s) Oral every 6 hours PRN agitation  OLANZapine Injectable 5 milliGRAM(s) IntraMuscular every 6 hours PRN severe agitation      Allergies    No Known Allergies    Intolerances        REVIEW OF SYSTEMS:  Please see interval HPI:    Vital Signs Last 24 Hrs  T(C): 36.7 (24 Jan 2022 05:01), Max: 36.7 (24 Jan 2022 05:01)  T(F): 98 (24 Jan 2022 05:01), Max: 98 (24 Jan 2022 05:01)  HR: 96 (24 Jan 2022 05:01) (78 - 96)  BP: 140/88 (24 Jan 2022 05:01) (140/88 - 153/78)  BP(mean): --  RR: 18 (24 Jan 2022 05:01) (17 - 18)  SpO2: 99% (24 Jan 2022 05:01) (99% - 99%)  I&O's Detail    23 Jan 2022 07:01  -  24 Jan 2022 07:00  --------------------------------------------------------  IN:    sodium chloride 0.9%: 360 mL  Total IN: 360 mL    OUT:  Total OUT: 0 mL    Total NET: 360 mL            PHYSICAL EXAM:  GENERAL:   HEAD:    EYES:   ENMT:   NECK:   NERVOUS SYSTEM:    CHEST/LUNG:   HEART:   ABDOMEN:   EXTREMITIES:    LYMPH:   SKIN:     LABS:                        13.5   6.22  )-----------( 247      ( 24 Jan 2022 06:51 )             42.0     24 Jan 2022 06:51    141    |  106    |  4      ----------------------------<  113    3.2     |  24     |  0.46     Ca    9.3        24 Jan 2022 06:51  Phos  3.1       24 Jan 2022 06:51  Mg     2.10      24 Jan 2022 06:51        CAPILLARY BLOOD GLUCOSE        BLOOD CULTURE    RADIOLOGY & ADDITIONAL TESTS:    Imaging Personally Reviewed:  [ ] YES     Consultant(s) Notes Reviewed:      Care Discussed with Consultants/Other Providers: [facility] Medicine  Pager [phone]    Patient is a 56y old  Female who presents with a chief complaint of Decreased PO intake, vomiting (23 Jan 2022 12:41)      INTERVAL HPI/OVERNIGHT EVENTS:  Patient has been refusing medications, other treatments.     Patient states "I'm fine" denies any complaints, moves arms and legs upon command. Encouraged PO intake and compliance w/ medications.     MEDICATIONS  (STANDING):  cloZAPine 25 milliGRAM(s) Oral at bedtime  enoxaparin Injectable 40 milliGRAM(s) SubCutaneous daily  levothyroxine 25 MICROGram(s) Oral daily  lithium 600 milliGRAM(s) Oral daily  polyethylene glycol 3350 17 Gram(s) Oral daily  senna 2 Tablet(s) Oral at bedtime  sodium chloride 0.9%. 1000 milliLiter(s) (30 mL/Hr) IV Continuous <Continuous>    MEDICATIONS  (PRN):  acetaminophen     Tablet .. 650 milliGRAM(s) Oral every 6 hours PRN Temp greater or equal to 38C (100.4F), Mild Pain (1 - 3)  aluminum hydroxide/magnesium hydroxide/simethicone Suspension 30 milliLiter(s) Oral every 4 hours PRN Dyspepsia  melatonin 3 milliGRAM(s) Oral at bedtime PRN Insomnia  OLANZapine 5 milliGRAM(s) Oral every 6 hours PRN agitation  OLANZapine Injectable 5 milliGRAM(s) IntraMuscular every 6 hours PRN severe agitation    Allergies  No Known Allergies    Intolerances    REVIEW OF SYSTEMS:  Please see interval HPI:    Vital Signs Last 24 Hrs  T(C): 36.7 (24 Jan 2022 05:01), Max: 36.7 (24 Jan 2022 05:01)  T(F): 98 (24 Jan 2022 05:01), Max: 98 (24 Jan 2022 05:01)  HR: 96 (24 Jan 2022 05:01) (78 - 96)  BP: 140/88 (24 Jan 2022 05:01) (140/88 - 153/78  RR: 18 (24 Jan 2022 05:01) (17 - 18)  SpO2: 99% (24 Jan 2022 05:01) (99% - 99%)  I&O's Detail    23 Jan 2022 07:01  -  24 Jan 2022 07:00  --------------------------------------------------------  IN:    sodium chloride 0.9%: 360 mL  Total IN: 360 mL    OUT:  Total OUT: 0 mL    Total NET: 360 mL    PHYSICAL EXAM:  GENERAL: NAD, lying in bed  HEAD:  NC/AT  EYES: clear sclera/conjunctiva  ENMT: MMM  NECK: supple  NERVOUS SYSTEM:  blunt affect, flat speech, moving extremities, followed simple commands   CHEST/LUNG: CTAB, comfortable on RA  HEART: S1S2 RRR  ABDOMEN: soft, non-tender  EXTREMITIES: no c/c/e       LABS:                        13.5   6.22  )-----------( 247      ( 24 Jan 2022 06:51 )             42.0     24 Jan 2022 06:51    141    |  106    |  4      ----------------------------<  113    3.2     |  24     |  0.46     Ca    9.3        24 Jan 2022 06:51  Phos  3.1       24 Jan 2022 06:51  Mg     2.10      24 Jan 2022 06:51    CAPILLARY BLOOD GLUCOSE    BLOOD CULTURE    RADIOLOGY & ADDITIONAL TESTS:    Imaging Personally Reviewed:  [ ] YES     Consultant(s) Notes Reviewed:  Psych    Care Discussed with Consultants/Other Providers: Psych Dr. Kang re: patient refusing medications, likely d/t psychiatric condition, given refusal plan to d/c lithium, clozaril etc, place on zyprexa instead (can be given IM), psych meds can potentially be adjusted further at [place], however patient's PO intake (or lack thereof) is a barrier to return. JENNIFER Das re: stopping antibiotics, psych recommendations

## 2022-01-24 NOTE — PROVIDER CONTACT NOTE (OTHER) - ACTION/TREATMENT ORDERED:
Continue to educate to need for telemetry monitoring, fluids, medications, vital signs, and assessment
will cont to re-approach consistently  management and team aware
will cont to re-approach consistently  management and team aware
Provider aware. Try again in 1 hour.
will cont to re-approach consistently  management and team aware
will cont to re-approach consistently  management and team aware
will cont to re-approach consistently  management and team aware  attending at bedside
Continue to educate to need for telemetry monitoring, fluids, medications, labs, vital signs, and assessment
will cont to re-approach consistently  management and team aware

## 2022-01-24 NOTE — PROVIDER CONTACT NOTE (OTHER) - DATE AND TIME:
24-Jan-2022 08:30
24-Jan-2022 14:16
21-Jan-2022 05:15
24-Jan-2022 09:20
24-Jan-2022 12:05
24-Jan-2022 12:30
19-Jan-2022 19:53
20-Jan-2022 21:11
24-Jan-2022 17:03

## 2022-01-24 NOTE — PROVIDER CONTACT NOTE (OTHER) - ASSESSMENT
no acute distress noted   vitals stable   fall safety maintained
Patient lying in bed refusing to answer questions, refusing assessment, and telemetry monitoring. patient lying comfortably in bed, no signs of distress noted.
Patient refusing repeat EKG. Patient at baseline mental status. RN attempted to fix tele leads earlier and patient stated nurse was trying to "sneak a peek." [place] aide at bedside.
no acute distress noted   vitals stable   fall safety maintained
Patient lying in bed refusing to answer questions, refusing assessment, and telemetry monitoring. patient lying comfortably in bed, no signs of distress noted.

## 2022-01-24 NOTE — BH CONSULTATION LIAISON PROGRESS NOTE - OTHER
superficially cooperative although answers are flat, unelaborated intense fair to setting refusing exam suspect paranoia seems internally preoccupied suspect paranoia/delusions

## 2022-01-24 NOTE — BH CONSULTATION LIAISON PROGRESS NOTE - NSBHASSESSMENTFT_PSY_ALL_CORE
Patient is a 57 yo female with PPHx of schizophrenia, PMHx of hypothyroidism, HLD, HTN, vit D def, and obesity transferred from [facility] for decreased PO intake, nausea and vomiting, concern for lithium toxicity, found to have metabolic derangements (elevated serum glucose with high anion gap metabolic acidosis concerning for clozapine-induced DKA), leukocytosis and positive UA concerning for UTI, and electrolyte imbalance 2/2 dehydration and vomiting. Seen by C/L Psych for medication management.     Per [facility] paperwork, patient had lithium on hold 1/18/22 for concern of lithium toxicity and decrease of clozapine from 200 mg QD to 100 mg QD. Unclear if patient had vomited or received her clozapine prior to presentation at [facility] ED. Recent bloodwork from 1/3/22 showed 766 clozapine level (normal 300-700) and lithium level 0.32. Lithium level 1/18/22 was 1.1.     1/20: On assessment, pt was calm and cooperative, responsive to all questions, states that she feels well. She refused repeat ECG this AM. Denies SIIP/HIIP, AVH, or delusions. VSS stable and afebrile, denies any physical discomfort, nausea/vomiting, or abdominal pain. Pt was restarted on lithium 600 mg QD.   1/21: Pt was calm and responsive to all questions, though childish and irritable when asked why she is refusing medications and care. Refused clozapine 25 mg dose last night. Did not require PRN's. She ate breakfast this AM and slept well. In NAD, denies any discomfort, pain, nausea/vomiting or dizziness. K+ level as of 1/20/22 was 2.9, serum glucose 132.   01/24/22: Pt suspicious-appearing, superficially cooperative, TP limited/concrete with impaired reasoning, denying all symptoms and insisting she does not need medications because she is "normal." Refusing medical care/meds. Refusal in engage in care appears to be psychotically driven given paranoid/suspicious behavior.    PLAN:  1) DISCONTINUE clozapine, lithium, and lamictal as patient is refusing these medications and intermittent use is unsafe/not advised.   2) Start Zyprexa Zydis 7.5mg PO qHS, hold for sedation/hypotension    - Give IM Zyprexa 7.5mg qHS if patient refuses PO as refusal to engage in medical care places patient at imminent risk of harm and appears to be psychotically-driven; patient does not have capacity to refuse psychiatric medication at this time    - Follow EKG to ensure QTc < 500ms.   3) PRN for agitation Zyprexa 5mg q6h PRN PO/IM, hold for sedation/hypotension  **note that IM Zyprexa should not be co-administered with IV/IM Ativan within 2 hrs**  4) No psychiatric indication for 1:1 CO; patient with obvs per 7S/mindful care unit  5) Patient should return to [place] inpatient once medically optimized; [facility] Psych to follow while medically hospitalized Patient is a 55 yo female with PPHx of schizophrenia, PMHx of hypothyroidism, HLD, HTN, vit D def, and obesity transferred from [facility] for decreased PO intake, nausea and vomiting, concern for lithium toxicity, found to have metabolic derangements (elevated serum glucose with high anion gap metabolic acidosis concerning for clozapine-induced DKA), leukocytosis and positive UA concerning for UTI, and electrolyte imbalance 2/2 dehydration and vomiting. Seen by C/L Psych for medication management.     Per [facility] paperwork, patient had lithium on hold 1/18/22 for concern of lithium toxicity and decrease of clozapine from 200 mg QD to 100 mg QD. Unclear if patient had vomited or received her clozapine prior to presentation at [facility] ED. Recent bloodwork from 1/3/22 showed 766 clozapine level (normal 300-700) and lithium level 0.32. Lithium level 1/18/22 was 1.1.     1/20: On assessment, pt was calm and cooperative, responsive to all questions, states that she feels well. She refused repeat ECG this AM. Denies SIIP/HIIP, AVH, or delusions. VSS stable and afebrile, denies any physical discomfort, nausea/vomiting, or abdominal pain. Pt was restarted on lithium 600 mg QD.   1/21: Pt was calm and responsive to all questions, though childish and irritable when asked why she is refusing medications and care. Refused clozapine 25 mg dose last night. Did not require PRN's. She ate breakfast this AM and slept well. In NAD, denies any discomfort, pain, nausea/vomiting or dizziness. K+ level as of 1/20/22 was 2.9, serum glucose 132.   01/24/22: Pt suspicious-appearing, superficially cooperative, TP limited/concrete with impaired reasoning, denying all symptoms and insisting she does not need medications because she is "normal." Refusing medical care/meds. Refusal in engage in care appears to be psychotically driven given paranoid/suspicious behavior.    PLAN:  1) DISCONTINUE clozapine, lithium, and lamictal as patient is refusing these medications and intermittent use is unsafe/not advised.   2) Start Zyprexa Zydis 7.5mg PO qHS, hold for sedation/hypotension and HOLD if qtc >500    - Give IM Zyprexa 7.5mg qHS if patient refuses PO as refusal to engage in medical care places patient at imminent risk of harm and appears to be psychotically-driven; patient does not have capacity to refuse psychiatric medication at this time    - Follow EKG to ensure QTc < 500ms.   3) PRN for agitation Zyprexa 5mg q6h PRN PO/IM, hold for sedation/hypotension  **note that IM Zyprexa should not be co-administered with IV/IM Ativan within 2 hrs**  4) No psychiatric indication for 1:1 CO; patient with obvs per 7S/mindful care unit  5) Patient should return to [place] inpatient once medically optimized; [facility] Psych to follow while medically hospitalized

## 2022-01-24 NOTE — PROVIDER CONTACT NOTE (OTHER) - REASON
Patient refusing repeat EKG
patient continuing to refuse tele,  all medication,  supplements and vital signs
patient continues to refuse all medication,  fluids and removed IV.
patient continuing to refuse tele
patient continues to refuse all medication,  fluids and removed IV.
patient continuing to refuse tele, medication, and vital signs
patient continuing to refuse tele
Patient refusing telemetry monitoring, assessment IV fluids, VS, labs, and medications
Patient refusing telemetry monitoring, assessment IV fluids, VS, and medications

## 2022-01-24 NOTE — PROVIDER CONTACT NOTE (OTHER) - NAME OF MD/NP/PA/DO NOTIFIED:
Referred to Other Pager  Tosin Belle, [phone]
Referred to Other Pager  Tosin Belle, [phone]
JENNIFER Morlilo
Referred to Other Pager  Tosin Belle, [phone]
Karina Borges. [facility] [id number]
JENNIFER Morillo
Referred to Other Pager  Tosin Belle, [phone]
Referred to Other Pager  Tosin Belle, [phone]
Referred to Other Pager  Tosin Belle, [phone]

## 2022-01-24 NOTE — PROVIDER CONTACT NOTE (OTHER) - RECOMMENDATIONS
Continue to educate to need for telemetry monitoring, fluids, medications,labs vital signs, and assessment
will cont to re-approach consistently  management and team aware
will cont to re-approach consistently  management and team aware
Continue to educate to need for telemetry monitoring, fluids, medications, vital signs, and assessment
will cont to re-approach consistently  management and team aware

## 2022-01-25 PROCEDURE — 99233 SBSQ HOSP IP/OBS HIGH 50: CPT

## 2022-01-25 PROCEDURE — 99232 SBSQ HOSP IP/OBS MODERATE 35: CPT

## 2022-01-25 RX ORDER — HALOPERIDOL DECANOATE 100 MG/ML
200 INJECTION INTRAMUSCULAR ONCE
Refills: 0 | Status: COMPLETED | OUTPATIENT
Start: 2022-01-26 | End: 2022-01-26

## 2022-01-25 RX ORDER — OLANZAPINE 15 MG/1
5 TABLET, FILM COATED ORAL ONCE
Refills: 0 | Status: COMPLETED | OUTPATIENT
Start: 2022-01-25 | End: 2022-01-25

## 2022-01-25 RX ADMIN — OLANZAPINE 5 MILLIGRAM(S): 15 TABLET, FILM COATED ORAL at 11:36

## 2022-01-25 NOTE — BH CONSULTATION LIAISON PROGRESS NOTE - OTHER
suspect paranoia/delusions intense seems internally preoccupied superficially cooperative although answers are flat, unelaborated refusing exam fair to setting internally preoccupied

## 2022-01-25 NOTE — BH CONSULTATION LIAISON PROGRESS NOTE - NSBHASSESSMENTFT_PSY_ALL_CORE
Patient is a 57 yo female with PPHx of schizophrenia, PMHx of hypothyroidism, HLD, HTN, vit D def, and obesity transferred from [facility] for decreased PO intake, nausea and vomiting, concern for lithium toxicity, found to have metabolic derangements (elevated serum glucose with high anion gap metabolic acidosis concerning for clozapine-induced DKA), leukocytosis and positive UA concerning for UTI, and electrolyte imbalance 2/2 dehydration and vomiting. Seen by C/L Psych for medication management.     Per [facility] paperwork, patient had lithium on hold 1/18/22 for concern of lithium toxicity and decrease of clozapine from 200 mg QD to 100 mg QD. Unclear if patient had vomited or received her clozapine prior to presentation at [facility] ED. Recent bloodwork from 1/3/22 showed 766 clozapine level (normal 300-700) and lithium level 0.32. Lithium level 1/18/22 was 1.1.     1/20: On assessment, pt was calm and cooperative, responsive to all questions, states that she feels well. She refused repeat ECG this AM. Denies SIIP/HIIP, AVH, or delusions. VSS stable and afebrile, denies any physical discomfort, nausea/vomiting, or abdominal pain. Pt was restarted on lithium 600 mg QD.   1/21: Pt was calm and responsive to all questions, though childish and irritable when asked why she is refusing medications and care. Refused clozapine 25 mg dose last night. Did not require PRN's. She ate breakfast this AM and slept well. In NAD, denies any discomfort, pain, nausea/vomiting or dizziness. K+ level as of 1/20/22 was 2.9, serum glucose 132.   01/24/22: Pt suspicious-appearing, superficially cooperative, TP limited/concrete with impaired reasoning, denying all symptoms and insisting she does not need medications because she is "normal." Refusing medical care/meds. Refusal in engage in care appears to be psychotically driven given paranoid/suspicious behavior.  1/25/22: No change in presentation from yesterday; continuing to refuse medical care/meds likely secondary to psychosis/paranoia.    PLAN:  1) Give patient IM Zyprexa 5mg today to treat psychosis interfering with medical care/assessment. Will reassess tomorrow to guide dose increases.  2) Patient to received IM Haldol decanoate 200mg TOMORROW, 1/26/22, per collateral/care coordination with [facility].   3) PRN for agitation Zyprexa 5mg q6h PRN PO/IM.  **note that IM Zyprexa should not be co-administered with IV/IM Ativan within 2 hrs**  4) Obtain an EKG as soon as patient is able to cooperative; at this time, as patient is awake/alert/ambulatory and refusing all medical assessment secondary to psychosis, benefit of treating psychosis to allow for proper assessment/treatment outweighs risk of adverse effects, including hypotension and rarely QTc prolongation increasing risk of toursades de pointes.  5) No psychiatric indication for 1:1 CO; patient with obvs per 7S/mindful care unit  6) Patient should return to [facility] once medically optimized; [facility] Psych to follow while medically hospitalized  7) Care coordinated with [place] inpatient psychiatrist Dr. Neal. Patient is a 57 yo female with PPHx of schizophrenia, PMHx of hypothyroidism, HLD, HTN, vit D def, and obesity transferred from [facility] for decreased PO intake, nausea and vomiting, concern for lithium toxicity, found to have metabolic derangements (elevated serum glucose with high anion gap metabolic acidosis concerning for clozapine-induced DKA), leukocytosis and positive UA concerning for UTI, and electrolyte imbalance 2/2 dehydration and vomiting. Seen by C/L Psych for medication management.     Per [facility] paperwork, patient had lithium on hold 1/18/22 for concern of lithium toxicity and decrease of clozapine from 200 mg QD to 100 mg QD. Unclear if patient had vomited or received her clozapine prior to presentation at [facility] ED. Recent bloodwork from 1/3/22 showed 766 clozapine level (normal 300-700) and lithium level 0.32. Lithium level 1/18/22 was 1.1.     1/20: On assessment, pt was calm and cooperative, responsive to all questions, states that she feels well. She refused repeat ECG this AM. Denies SIIP/HIIP, AVH, or delusions. VSS stable and afebrile, denies any physical discomfort, nausea/vomiting, or abdominal pain. Pt was restarted on lithium 600 mg QD.   1/21: Pt was calm and responsive to all questions, though childish and irritable when asked why she is refusing medications and care. Refused clozapine 25 mg dose last night. Did not require PRN's. She ate breakfast this AM and slept well. In NAD, denies any discomfort, pain, nausea/vomiting or dizziness. K+ level as of 1/20/22 was 2.9, serum glucose 132.   01/24/22: Pt suspicious-appearing, superficially cooperative, TP limited/concrete with impaired reasoning, denying all symptoms and insisting she does not need medications because she is "normal." Refusing medical care/meds. Refusal in engage in care appears to be psychotically driven given paranoid/suspicious behavior.  1/25/22: No change in presentation from yesterday; continuing to refuse medical care/meds likely secondary to psychosis/paranoia.    PLAN:  1) Give patient IM Zyprexa 5mg today to treat psychosis interfering with medical care/assessment. Will reassess tomorrow to guide dose increases.  2) Patient to received IM Haldol decanoate 200mg TOMORROW, 1/26/22, per collateral/care coordination with [place] inpatient psychiatrist Dr. Dewitt   3) PRN for agitation Zyprexa 5mg q6h PRN PO/IM.  **note that IM Zyprexa should not be co-administered with IV/IM Ativan within 2 hrs**  4) Obtain an EKG as soon as patient is able to cooperative; at this time, as patient is awake/alert/ambulatory and refusing all medical assessment secondary to psychosis, benefit of treating psychosis to allow for proper assessment/treatment outweighs risk of adverse effects, including hypotension and rarely QTc prolongation increasing risk of torsades de pointes.  5) No psychiatric indication for 1:1 CO; patient with obvs per 7S/mindful care unit  6) Patient should return to [facility] once medically optimized; [facility] Psych to follow while medically hospitalized  7) Care coordinated with [place] inpatient psychiatrist Dr. Dewitt #[phone]

## 2022-01-25 NOTE — BH CONSULTATION LIAISON PROGRESS NOTE - NSBHFUPINTERVALHXFT_PSY_A_CORE
Chart reviewed. No behavioral health PRNs. Patient continuing to refuse all PO medications and medical assessment, including vitals and labs. Case discussed in morning rounds.    Patient seen and examined. Sitting up in bed in front of covered food tray. Glaring suspiciously at interviewer. States she feels fine and irritably denies low mood, AH/VH, SHIIP, and paranoia. Mutters to self during questions and refusing to answer orientation questions or engage in discussion of why she is in the hospital. Continues to insist she is "fine" and does not need medications.

## 2022-01-25 NOTE — PROGRESS NOTE ADULT - SUBJECTIVE AND OBJECTIVE BOX
[facility] Medicine  Pager [phone]    Patient is a 56y old  Female who presents with a chief complaint of Decreased PO intake, vomiting (24 Jan 2022 11:21)      INTERVAL HPI/OVERNIGHT EVENTS:    MEDICATIONS  (STANDING):  enoxaparin Injectable 40 milliGRAM(s) SubCutaneous daily  levothyroxine 25 MICROGram(s) Oral daily  OLANZapine Injectable 5 milliGRAM(s) IntraMuscular once  polyethylene glycol 3350 17 Gram(s) Oral daily  senna 2 Tablet(s) Oral at bedtime  sodium chloride 0.9%. 1000 milliLiter(s) (30 mL/Hr) IV Continuous <Continuous>    MEDICATIONS  (PRN):  acetaminophen     Tablet .. 650 milliGRAM(s) Oral every 6 hours PRN Temp greater or equal to 38C (100.4F), Mild Pain (1 - 3)  aluminum hydroxide/magnesium hydroxide/simethicone Suspension 30 milliLiter(s) Oral every 4 hours PRN Dyspepsia  melatonin 3 milliGRAM(s) Oral at bedtime PRN Insomnia  OLANZapine 5 milliGRAM(s) Oral every 6 hours PRN agitation  OLANZapine Injectable 5 milliGRAM(s) IntraMuscular every 6 hours PRN severe agitation      Allergies    No Known Allergies    Intolerances        REVIEW OF SYSTEMS:  Please see interval HPI:    Vital Signs Last 24 Hrs  T(C): 37 (24 Jan 2022 21:59), Max: 37 (24 Jan 2022 21:59)  T(F): 98.6 (24 Jan 2022 21:59), Max: 98.6 (24 Jan 2022 21:59)  HR: 66 (24 Jan 2022 21:59) (66 - 66)  BP: 157/73 (24 Jan 2022 21:59) (157/73 - 157/73)  BP(mean): --  RR: 18 (24 Jan 2022 21:59) (18 - 18)  SpO2: 100% (24 Jan 2022 21:59) (100% - 100%)  I&O's Detail    24 Jan 2022 07:01  -  25 Jan 2022 07:00  --------------------------------------------------------  IN:    Oral Fluid: 704 mL  Total IN: 704 mL    OUT:  Total OUT: 0 mL    Total NET: 704 mL            PHYSICAL EXAM:  GENERAL:   HEAD:    EYES:   ENMT:   NECK:   NERVOUS SYSTEM:    CHEST/LUNG:   HEART:   ABDOMEN:   EXTREMITIES:    LYMPH:   SKIN:     LABS:      Ca    9.3        24 Jan 2022 06:51        CAPILLARY BLOOD GLUCOSE        BLOOD CULTURE    RADIOLOGY & ADDITIONAL TESTS:    Imaging Personally Reviewed:  [ ] YES     Consultant(s) Notes Reviewed:      Care Discussed with Consultants/Other Providers: [facility] Medicine  Pager [phone]    Patient is a 56y old  Female who presents with a chief complaint of Decreased PO intake, vomiting (24 Jan 2022 11:21)      INTERVAL HPI/OVERNIGHT EVENTS:  Has been refusing meds/vitals etc.   Food appears untouched. Patient reports "I'm fine", when asked about food intake replies "I'm not hungry".     MEDICATIONS  (STANDING):  enoxaparin Injectable 40 milliGRAM(s) SubCutaneous daily  levothyroxine 25 MICROGram(s) Oral daily  OLANZapine Injectable 5 milliGRAM(s) IntraMuscular once  polyethylene glycol 3350 17 Gram(s) Oral daily  senna 2 Tablet(s) Oral at bedtime  sodium chloride 0.9%. 1000 milliLiter(s) (30 mL/Hr) IV Continuous <Continuous>    MEDICATIONS  (PRN):  acetaminophen     Tablet .. 650 milliGRAM(s) Oral every 6 hours PRN Temp greater or equal to 38C (100.4F), Mild Pain (1 - 3)  aluminum hydroxide/magnesium hydroxide/simethicone Suspension 30 milliLiter(s) Oral every 4 hours PRN Dyspepsia  melatonin 3 milliGRAM(s) Oral at bedtime PRN Insomnia  OLANZapine 5 milliGRAM(s) Oral every 6 hours PRN agitation  OLANZapine Injectable 5 milliGRAM(s) IntraMuscular every 6 hours PRN severe agitation    Allergies  No Known Allergies    Intolerances    REVIEW OF SYSTEMS:  Please see interval HPI:    Vital Signs Last 24 Hrs  T(C): 37 (24 Jan 2022 21:59), Max: 37 (24 Jan 2022 21:59)  T(F): 98.6 (24 Jan 2022 21:59), Max: 98.6 (24 Jan 2022 21:59)  HR: 66 (24 Jan 2022 21:59) (66 - 66)  BP: 157/73 (24 Jan 2022 21:59) (157/73 - 157/73)  RR: 18 (24 Jan 2022 21:59) (18 - 18)  SpO2: 100% (24 Jan 2022 21:59) (100% - 100%)  I&O's Detail    24 Jan 2022 07:01  -  25 Jan 2022 07:00  --------------------------------------------------------  IN:    Oral Fluid: 704 mL  Total IN: 704 mL    OUT:  Total OUT: 0 mL    Total NET: 704 mL    PHYSICAL EXAM:  GENERAL: NAD, lying (diagonally) in bed  HEAD:  NC/AT  EYES: clear sclera/conjunctiva  ENMT: MMM  NECK: supple  NERVOUS SYSTEM:  blunt affect, flat speech, moving extremities, followed simple commands   CHEST/LUNG: CTAB, comfortable on RA  HEART: S1S2 RRR  ABDOMEN: soft, non-tender  EXTREMITIES: no c/c/e       LABS:                        13.5   6.22  )-----------( 247      ( 24 Jan 2022 06:51 )             42.0     01-24    141  |  106  |  4<L>  ----------------------------<  113<H>  3.2<L>   |  24  |  0.46<L>    Ca    9.3      24 Jan 2022 06:51  Phos  3.1     01-24  Mg     2.10     01-24    CAPILLARY BLOOD GLUCOSE    BLOOD CULTURE    RADIOLOGY & ADDITIONAL TESTS:    Imaging Personally Reviewed:  [ ] YES     Consultant(s) Notes Reviewed: Psych     Care Discussed with Consultants/Other Providers: Psych re: using IM Zyprexa, they feel med noncompliance and poor PO intake 2/2 psychiatric condition, ACP Amy re: psych recs, pending optimization, monitor PO intake

## 2022-01-25 NOTE — PROGRESS NOTE ADULT - ASSESSMENT
55 yo F w/ schizophrenia, hypothyroidism, presenting from [place] for decreased PO intake, N/V, metabolic derangements, admitted w/ sepsis 2/2 UTI, s/p course of antibiotics and IV fluids with clinical improvement, course c/b medication non-compliance.     # Sepsis 2/2 UTI  - +leukocytosis and tachycardia on admission, w/ +UA  - Ucx non-informative, currently afebrile, not tachycardic, no leukocytosis, sepsis resolved  - completed empiric course of CTX (s/p 5 doses)    # Schizophrenia c/b medication non-compliance  - psych input appreciated, d/carloz lamictal, lithium and clozaril as patient non-compliant w/ medications  - c/w zyprexa as per psych (can be given IM if needed)  - no capacity to leave AMA  - anticipate return to [place] once optimized    # Inadequate oral intake  - suspect due psychiatric condition, c/w psychiatric optimization as above  - nutrition recs appreciated  - diet liberalized, c/w ensure supplementation, encourage PO intake    # Hypothyroidism  - c/w synthroid    # Hypokalemia  - repleted K on 1/24, continue to monitor and replete prn    # Hypercalemia  - resolved s/p IV fluids    VTE ppx: lovenox    Dispo: anticipate return to [place] pending medical/psychiatric optimization, need to ensure adequate PO intake     57 yo F w/ schizophrenia, hypothyroidism, presenting from [place] for decreased PO intake, N/V, metabolic derangements, admitted w/ sepsis 2/2 UTI, s/p course of antibiotics and IV fluids with clinical improvement, course c/b medication non-compliance.     # Sepsis 2/2 UTI  - +leukocytosis and tachycardia on admission, w/ +UA  - Ucx non-informative, currently afebrile, not tachycardic, no leukocytosis, sepsis resolved  - completed empiric course of CTX (s/p 5 doses)    # Schizophrenia c/b medication non-compliance  - psych input appreciated, d/carloz lamictal, lithium and clozaril as patient non-compliant w/ medications  - c/w zyprexa as per psych (can be given IM if needed)  - no capacity to leave AMA  - anticipate return to [place] once optimized    # Inadequate oral intake  - suspect due psychiatric condition, c/w psychiatric optimization as above  - nutrition recs appreciated  - diet liberalized, c/w ensure supplementation, encourage PO intake    # Hypothyroidism  - c/w synthroid    # Hypokalemia  - repleted K on 1/24, continue to monitor and replete prn    # Hypercalemia  - resolved s/p IV fluids    VTE ppx: lovenox    Dispo: anticipate return to [place] pending medical/psychiatric optimization, need to ensure adequate PO intake      Addendum: d/w Dr. Kang re: coordination w/ [place] psychiatrist, plan for long acting Haldol decanoate IM tomorrow 1/26, c/w IM zyprexa, hopeful that psych optimization will improve PO intake and assist in returning patient to [place]

## 2022-01-26 LAB
ANION GAP SERPL CALC-SCNC: 15 MMOL/L — HIGH (ref 7–14)
BUN SERPL-MCNC: 3 MG/DL — LOW (ref 7–23)
CALCIUM SERPL-MCNC: 9.5 MG/DL — SIGNIFICANT CHANGE UP (ref 8.4–10.5)
CHLORIDE SERPL-SCNC: 105 MMOL/L — SIGNIFICANT CHANGE UP (ref 98–107)
CO2 SERPL-SCNC: 21 MMOL/L — LOW (ref 22–31)
CREAT SERPL-MCNC: 0.48 MG/DL — LOW (ref 0.5–1.3)
GLUCOSE SERPL-MCNC: 112 MG/DL — HIGH (ref 70–99)
HCT VFR BLD CALC: 45.3 % — HIGH (ref 34.5–45)
HGB BLD-MCNC: 14.6 G/DL — SIGNIFICANT CHANGE UP (ref 11.5–15.5)
MAGNESIUM SERPL-MCNC: 2.1 MG/DL — SIGNIFICANT CHANGE UP (ref 1.6–2.6)
MCHC RBC-ENTMCNC: 28 PG — SIGNIFICANT CHANGE UP (ref 27–34)
MCHC RBC-ENTMCNC: 32.2 GM/DL — SIGNIFICANT CHANGE UP (ref 32–36)
MCV RBC AUTO: 86.9 FL — SIGNIFICANT CHANGE UP (ref 80–100)
NRBC # BLD: 0 /100 WBCS — SIGNIFICANT CHANGE UP
NRBC # FLD: 0 K/UL — SIGNIFICANT CHANGE UP
PHOSPHATE SERPL-MCNC: 3.6 MG/DL — SIGNIFICANT CHANGE UP (ref 2.5–4.5)
PLATELET # BLD AUTO: 194 K/UL — SIGNIFICANT CHANGE UP (ref 150–400)
POTASSIUM SERPL-MCNC: 3.4 MMOL/L — LOW (ref 3.5–5.3)
POTASSIUM SERPL-SCNC: 3.4 MMOL/L — LOW (ref 3.5–5.3)
RBC # BLD: 5.21 M/UL — HIGH (ref 3.8–5.2)
RBC # FLD: 14.6 % — HIGH (ref 10.3–14.5)
SODIUM SERPL-SCNC: 141 MMOL/L — SIGNIFICANT CHANGE UP (ref 135–145)
WBC # BLD: 6.25 K/UL — SIGNIFICANT CHANGE UP (ref 3.8–10.5)
WBC # FLD AUTO: 6.25 K/UL — SIGNIFICANT CHANGE UP (ref 3.8–10.5)

## 2022-01-26 PROCEDURE — 99231 SBSQ HOSP IP/OBS SF/LOW 25: CPT

## 2022-01-26 PROCEDURE — 99232 SBSQ HOSP IP/OBS MODERATE 35: CPT

## 2022-01-26 RX ORDER — POTASSIUM CHLORIDE 20 MEQ
40 PACKET (EA) ORAL ONCE
Refills: 0 | Status: COMPLETED | OUTPATIENT
Start: 2022-01-26 | End: 2022-01-26

## 2022-01-26 RX ORDER — OLANZAPINE 15 MG/1
5 TABLET, FILM COATED ORAL AT BEDTIME
Refills: 0 | Status: DISCONTINUED | OUTPATIENT
Start: 2022-01-26 | End: 2022-01-28

## 2022-01-26 RX ORDER — OLANZAPINE 15 MG/1
5 TABLET, FILM COATED ORAL AT BEDTIME
Refills: 0 | Status: DISCONTINUED | OUTPATIENT
Start: 2022-01-26 | End: 2022-01-26

## 2022-01-26 RX ADMIN — OLANZAPINE 5 MILLIGRAM(S): 15 TABLET, FILM COATED ORAL at 21:29

## 2022-01-26 RX ADMIN — HALOPERIDOL DECANOATE 200 MILLIGRAM(S): 100 INJECTION INTRAMUSCULAR at 13:36

## 2022-01-26 NOTE — BH CONSULTATION LIAISON PROGRESS NOTE - NSBHASSESSMENTFT_PSY_ALL_CORE
Patient is a 57 yo female with PPHx of schizophrenia, PMHx of hypothyroidism, HLD, HTN, vit D def, and obesity transferred from [facility] for decreased PO intake, nausea and vomiting, concern for lithium toxicity, found to have metabolic derangements (elevated serum glucose with high anion gap metabolic acidosis concerning for clozapine-induced DKA), leukocytosis and positive UA concerning for UTI, and electrolyte imbalance 2/2 dehydration and vomiting. Seen by C/L Psych for medication management.     Per [facility] paperwork, patient had lithium on hold 1/18/22 for concern of lithium toxicity and decrease of clozapine from 200 mg QD to 100 mg QD. Unclear if patient had vomited or received her clozapine prior to presentation at [facility] ED. Recent bloodwork from 1/3/22 showed 766 clozapine level (normal 300-700) and lithium level 0.32. Lithium level 1/18/22 was 1.1.     1/20: On assessment, pt was calm and cooperative, responsive to all questions, states that she feels well. She refused repeat ECG this AM. Denies SIIP/HIIP, AVH, or delusions. VSS stable and afebrile, denies any physical discomfort, nausea/vomiting, or abdominal pain. Pt was restarted on lithium 600 mg QD.   1/21: Pt was calm and responsive to all questions, though childish and irritable when asked why she is refusing medications and care. Refused clozapine 25 mg dose last night. Did not require PRN's. She ate breakfast this AM and slept well. In NAD, denies any discomfort, pain, nausea/vomiting or dizziness. K+ level as of 1/20/22 was 2.9, serum glucose 132.   01/24/22: Pt suspicious-appearing, superficially cooperative, TP limited/concrete with impaired reasoning, denying all symptoms and insisting she does not need medications because she is "normal." Refusing medical care/meds. Refusal in engage in care appears to be psychotically driven given paranoid/suspicious behavior.  1/25/22: No change in presentation from yesterday; continuing to refuse medical care/meds likely secondary to psychosis/paranoia.  1/26/22: Remains guarded, paranoid, refusing PO meds/poor po intake likely due to underlying psychosis. Superficially denies SI and HI.         PLAN:  1) Cont. Zyprexa Zydis 5mg PO qHS, hold for sedation/hypotension and HOLD if qtc >500    - Give IM Zyprexa 5mg qHS if patient refuses PO as refusal to engage in medical care places patient at imminent risk of harm and appears to be psychotically-driven; patient does not have capacity to refuse psychiatric medication at this time  2) Patient to received IM Haldol decanoate 200mg TODAY, 1/26/22, per collateral/care coordination with [facility] psychiatrist Dr. Dewitt   3) PRN for agitation Zyprexa 5mg q6h PRN PO/IM.  **note that IM Zyprexa should not be co-administered with IV/IM Ativan within 2 hrs**  4) Obtain an EKG to monitor QTC.  5) No psychiatric indication for 1:1 CO; patient with obvs per 7S/mindful care unit  6) Patient should return to [facility] once medically optimized; [facility] Psych to follow while medically hospitalized  7) Care coordinated with [facility] psychiatrist Dr. Dewitt #[phone] on 1/25  8)Care coordinated with Dr. Chandra

## 2022-01-26 NOTE — BH CONSULTATION LIAISON PROGRESS NOTE - NSBHFUPINTERVALHXFT_PSY_A_CORE
Chart reviewed. No behavioral health PRNs. Patient continuing to refuse PO medications, continues to have poor PO intake.     Patient seen and evaluated. Patient alert, minimally engaging,  seems paranoid/suspicious, guarded and giving very short answers. She superficially and  irritably denies low mood, AH/VH, SHIIP, and paranoia. She refused to answer orientation questions. She continues to insist she is feeling "OK" and does not need medications or food.

## 2022-01-26 NOTE — PROGRESS NOTE ADULT - SUBJECTIVE AND OBJECTIVE BOX
[facility] Medicine  Pager [phone]    Patient is a 56y old  Female who presents with a chief complaint of Decreased PO intake, vomiting (25 Jan 2022 10:36)      INTERVAL HPI/OVERNIGHT EVENTS:    MEDICATIONS  (STANDING):  enoxaparin Injectable 40 milliGRAM(s) SubCutaneous daily  haloperidol decanoate Injectable, Long Acting 200 milliGRAM(s) IntraMuscular once  levothyroxine 25 MICROGram(s) Oral daily  OLANZapine Disintegrating Tablet 5 milliGRAM(s) Oral at bedtime  OLANZapine Injectable 5 milliGRAM(s) IntraMuscular at bedtime  polyethylene glycol 3350 17 Gram(s) Oral daily  senna 2 Tablet(s) Oral at bedtime  sodium chloride 0.9%. 1000 milliLiter(s) (30 mL/Hr) IV Continuous <Continuous>    MEDICATIONS  (PRN):  acetaminophen     Tablet .. 650 milliGRAM(s) Oral every 6 hours PRN Temp greater or equal to 38C (100.4F), Mild Pain (1 - 3)  aluminum hydroxide/magnesium hydroxide/simethicone Suspension 30 milliLiter(s) Oral every 4 hours PRN Dyspepsia  melatonin 3 milliGRAM(s) Oral at bedtime PRN Insomnia  OLANZapine 5 milliGRAM(s) Oral every 6 hours PRN agitation  OLANZapine Injectable 5 milliGRAM(s) IntraMuscular every 6 hours PRN severe agitation      Allergies    No Known Allergies    Intolerances        REVIEW OF SYSTEMS:  Please see interval HPI:    Vital Signs Last 24 Hrs  T(C): 36.7 (26 Jan 2022 05:19), Max: 36.8 (25 Jan 2022 22:05)  T(F): 98 (26 Jan 2022 05:19), Max: 98.3 (25 Jan 2022 22:05)  HR: 63 (25 Jan 2022 22:05) (63 - 86)  BP: 176/82 (26 Jan 2022 06:49) (144/85 - 176/82)  BP(mean): --  RR: 18 (26 Jan 2022 05:19) (18 - 18)  SpO2: 100% (26 Jan 2022 05:19) (99% - 100%)  I&O's Detail        PHYSICAL EXAM:  GENERAL:   HEAD:    EYES:   ENMT:   NECK:   NERVOUS SYSTEM:    CHEST/LUNG:   HEART:   ABDOMEN:   EXTREMITIES:    LYMPH:   SKIN:     LABS:                        14.6   6.25  )-----------( 194      ( 26 Jan 2022 07:49 )             45.3     26 Jan 2022 07:49    141    |  105    |  3      ----------------------------<  112    3.4     |  21     |  0.48     Ca    9.5        26 Jan 2022 07:49  Phos  3.6       26 Jan 2022 07:49  Mg     2.10      26 Jan 2022 07:49        CAPILLARY BLOOD GLUCOSE        BLOOD CULTURE    RADIOLOGY & ADDITIONAL TESTS:    Imaging Personally Reviewed:  [ ] YES     Consultant(s) Notes Reviewed:      Care Discussed with Consultants/Other Providers: [facility] Medicine  Pager [phone]    Patient is a 56y old  Female who presents with a chief complaint of Decreased PO intake, vomiting (25 Jan 2022 10:36)    INTERVAL HPI/OVERNIGHT EVENTS:  Food remains untouched on tray. Patient states "I'm fine", answers in the negative when asked about specific complaints, then states "stop asking questions, leave me alone".    MEDICATIONS  (STANDING):  enoxaparin Injectable 40 milliGRAM(s) SubCutaneous daily  haloperidol decanoate Injectable, Long Acting 200 milliGRAM(s) IntraMuscular once  levothyroxine 25 MICROGram(s) Oral daily  OLANZapine Disintegrating Tablet 5 milliGRAM(s) Oral at bedtime  OLANZapine Injectable 5 milliGRAM(s) IntraMuscular at bedtime  polyethylene glycol 3350 17 Gram(s) Oral daily  senna 2 Tablet(s) Oral at bedtime  sodium chloride 0.9%. 1000 milliLiter(s) (30 mL/Hr) IV Continuous <Continuous>    MEDICATIONS  (PRN):  acetaminophen     Tablet .. 650 milliGRAM(s) Oral every 6 hours PRN Temp greater or equal to 38C (100.4F), Mild Pain (1 - 3)  aluminum hydroxide/magnesium hydroxide/simethicone Suspension 30 milliLiter(s) Oral every 4 hours PRN Dyspepsia  melatonin 3 milliGRAM(s) Oral at bedtime PRN Insomnia  OLANZapine 5 milliGRAM(s) Oral every 6 hours PRN agitation  OLANZapine Injectable 5 milliGRAM(s) IntraMuscular every 6 hours PRN severe agitation    Allergies  No Known Allergies    Intolerances    REVIEW OF SYSTEMS:  Please see interval HPI:    Vital Signs Last 24 Hrs  T(C): 36.7 (26 Jan 2022 05:19), Max: 36.8 (25 Jan 2022 22:05)  T(F): 98 (26 Jan 2022 05:19), Max: 98.3 (25 Jan 2022 22:05)  HR: 63 (25 Jan 2022 22:05) (63 - 86)  BP: 176/82 (26 Jan 2022 06:49) (144/85 - 176/82)  RR: 18 (26 Jan 2022 05:19) (18 - 18)  SpO2: 100% (26 Jan 2022 05:19) (99% - 100%)  I&O's Detail    PHYSICAL EXAM:  GENERAL: NAD, lying in bed  HEAD:  NC/AT  EYES: clear sclera/conjunctiva  ENMT: MMM  NECK: supple  NERVOUS SYSTEM:  blunt affect, flat speech, moving extremities  CHEST/LUNG: comfortable on RA, no respiratory distress  ABDOMEN: soft, non-tender  EXTREMITIES: no c/c/e     LABS:                        14.6   6.25  )-----------( 194      ( 26 Jan 2022 07:49 )             45.3     26 Jan 2022 07:49    141    |  105    |  3      ----------------------------<  112    3.4     |  21     |  0.48     Ca    9.5        26 Jan 2022 07:49  Phos  3.6       26 Jan 2022 07:49  Mg     2.10      26 Jan 2022 07:49    CAPILLARY BLOOD GLUCOSE    BLOOD CULTURE    RADIOLOGY & ADDITIONAL TESTS:    Imaging Personally Reviewed:  [ ] YES     Consultant(s) Notes Reviewed:  Psych    Care Discussed with Consultants/Other Providers: Psych Dr. Kang re: to get long acting IM haldol today, to order zyprexa 5 mg disintegrating tablet qhs, with 5mg IM dose to be given if refusing PO med... plan for psych optimization to improve PO intake prior to return to [place]. JENNIFER Kemp re: psych recs, hypokalemia - replete K

## 2022-01-26 NOTE — BH CONSULTATION LIAISON PROGRESS NOTE - OTHER
refusing exam internally preoccupied suspect paranoia/delusions fair to setting superficially cooperative although answers are short and unelaborated

## 2022-01-26 NOTE — PROGRESS NOTE ADULT - ASSESSMENT
57 yo F w/ schizophrenia, hypothyroidism, presenting from [facility] for decreased PO intake, N/V, metabolic derangements, admitted w/ sepsis 2/2 UTI, s/p course of antibiotics and IV fluids with clinical improvement, course c/b medication non-compliance.     # Sepsis 2/2 UTI  - +leukocytosis and tachycardia on admission, w/ +UA  - Ucx non-informative, currently afebrile, not tachycardic, no leukocytosis, sepsis resolved  - completed empiric course of CTX (s/p 5 doses)    # Schizophrenia c/b medication non-compliance  - psych input appreciated, d/carloz lamictal, lithium and clozaril as patient non-compliant w/ medications  - c/w zyprexa as per psych (ordered for disintergrating tab qhs, with IM dose to be given if refused PO formulation)  - getting long acting IM haldol decanoate today  - no capacity to leave AMA  - anticipate return to [facility] once optimized/taking PO    # Inadequate oral intake  - suspect due psychiatric condition, c/w psychiatric optimization as above  - nutrition recs appreciated  - diet liberalized, c/w ensure supplementation, encourage PO intake    # Hypothyroidism  - c/w synthroid    # Hypokalemia  - K 3.4, replete K    # Hypercalemia  - resolved s/p IV fluids    VTE ppx: lovenox    Dispo: anticipate return to [facility] pending psychiatric optimization and improvement in PO intake

## 2022-01-27 ENCOUNTER — TRANSCRIPTION ENCOUNTER (OUTPATIENT)
Age: 57
End: 2022-01-27

## 2022-01-27 LAB
ANION GAP SERPL CALC-SCNC: 15 MMOL/L — HIGH (ref 7–14)
BUN SERPL-MCNC: 3 MG/DL — LOW (ref 7–23)
CALCIUM SERPL-MCNC: 9.3 MG/DL — SIGNIFICANT CHANGE UP (ref 8.4–10.5)
CHLORIDE SERPL-SCNC: 102 MMOL/L — SIGNIFICANT CHANGE UP (ref 98–107)
CO2 SERPL-SCNC: 23 MMOL/L — SIGNIFICANT CHANGE UP (ref 22–31)
CREAT SERPL-MCNC: 0.5 MG/DL — SIGNIFICANT CHANGE UP (ref 0.5–1.3)
GLUCOSE SERPL-MCNC: 103 MG/DL — HIGH (ref 70–99)
HCT VFR BLD CALC: 47 % — HIGH (ref 34.5–45)
HGB BLD-MCNC: 15.8 G/DL — HIGH (ref 11.5–15.5)
MAGNESIUM SERPL-MCNC: 2.1 MG/DL — SIGNIFICANT CHANGE UP (ref 1.6–2.6)
MCHC RBC-ENTMCNC: 28.6 PG — SIGNIFICANT CHANGE UP (ref 27–34)
MCHC RBC-ENTMCNC: 33.6 GM/DL — SIGNIFICANT CHANGE UP (ref 32–36)
MCV RBC AUTO: 85 FL — SIGNIFICANT CHANGE UP (ref 80–100)
NRBC # BLD: 0 /100 WBCS — SIGNIFICANT CHANGE UP
NRBC # FLD: 0 K/UL — SIGNIFICANT CHANGE UP
PHOSPHATE SERPL-MCNC: 3.3 MG/DL — SIGNIFICANT CHANGE UP (ref 2.5–4.5)
PLATELET # BLD AUTO: 273 K/UL — SIGNIFICANT CHANGE UP (ref 150–400)
POTASSIUM SERPL-MCNC: 3.4 MMOL/L — LOW (ref 3.5–5.3)
POTASSIUM SERPL-SCNC: 3.4 MMOL/L — LOW (ref 3.5–5.3)
RBC # BLD: 5.53 M/UL — HIGH (ref 3.8–5.2)
RBC # FLD: 14.6 % — HIGH (ref 10.3–14.5)
SARS-COV-2 RNA SPEC QL NAA+PROBE: SIGNIFICANT CHANGE UP
SODIUM SERPL-SCNC: 140 MMOL/L — SIGNIFICANT CHANGE UP (ref 135–145)
WBC # BLD: 6.09 K/UL — SIGNIFICANT CHANGE UP (ref 3.8–10.5)
WBC # FLD AUTO: 6.09 K/UL — SIGNIFICANT CHANGE UP (ref 3.8–10.5)

## 2022-01-27 PROCEDURE — 99232 SBSQ HOSP IP/OBS MODERATE 35: CPT

## 2022-01-27 RX ADMIN — OLANZAPINE 5 MILLIGRAM(S): 15 TABLET, FILM COATED ORAL at 21:14

## 2022-01-27 NOTE — DISCHARGE NOTE PROVIDER - HOSPITAL COURSE
56 F with PMHx of Schizophrenia and Hypothyroidism who presented from [facility] for decreased PO intake and N/V with concern for lithium toxicity but ultimately ruled out, found to have UTI and metabolic derangements 2/2 dehydration and vomiting.     Hospital Course   Sepsis secondary to UTI  - Leukocytosis with tachycardia, source of infection likely urine with UA showing mod leuk esterase with WBC and bacteria   - s/p 2L IVF in ED   - s/p 1G ceftriaxone in ED  - BCx : NGTD   -Ucx non-informative, currently afebrile, not tachycardic, no leukocytosis, sepsis resolved  - completed empiric course of CTX (s/p 5 doses)      Prolonged QT interval  - in setting of metabolic derangements   - repeat EKG at 12 to check QTc   - psych eval in AM to see which medications to restart  - ok to restart Clozapine 1/20; titrate up to 100mg    Hypothyroidism  - c/w synthroid    Hypokalemia  - likely i/s/o vomiting and dehydration   - s/p K+ repletion,   -supplemented    Hypercalcemia  - Serum Ca 11, serum phos is low, suggesting possibly a PTH mediated process   - hypercalcemia work up with PTH, Vitamin D level, TSH   - patient received IVF, will continue at low rate of 100 cc/hr of normal saline   - Serum calcium now 9.0  - currently patient is denying complaints but possibly could have contributed to patients N/V at [facility]   - trend Ca on next CMP  - resolved s/p IV fluids    Dehydration  - i/s/o recent vomiting   - UA w/ large ketones likely pointing towards a starvation ketosis   - will continue IVF hydration with 100cc/hr LR  - monitor Scr, lytes  - diet liberalized, c/w ensure supplementation, encourage PO intake  - patient is starting to take PO    Schizophrenia  - on lamotrigine, lithium, clozapine   - psych input appreciated, d/carloz Lamictal, lithium and Clozaril as patient non-compliant w/ medications  - c/w Zyprexa as per psych (ordered for disintegrating tab qhs, with IM dose to be given if refused PO formulation)  - s/p long acting IM haldol decanoate 1/26  - no capacity to leave AMA  - patient from [facility]; day team to reach out to patients psychiatrist to discuss medication regimen and what doses to start  ---> non compliant + refusing psych meds . Now eating meals and taking medications.          [facility]               56 F with PMHx of Schizophrenia and Hypothyroidism who presented from [facility] for decreased PO intake and N/V with concern for lithium toxicity but ultimately ruled out, found to have UTI and metabolic derangements 2/2 dehydration and vomiting.     Hospital Course   Sepsis secondary to UTI  - met sepsis criteria with Leukocytosis with tachycardia, source of infection likely urine with UA showing mod leuk esterase with WBC and bacteria   - s/p 2L IVF in ED   - s/p 1G ceftriaxone in ED  - BCx : NGTD   - Ucx non-informative, currently afebrile, not tachycardic, no leukocytosis, sepsis resolved  - completed empiric course of CTX (s/p 5 doses)    Prolonged QT interval  - in setting of metabolic derangements   - monitored on EKG    Hypothyroidism  - c/w synthroid    Hypokalemia  - likely i/s/o vomiting and dehydration   - s/p K+ repletion,   -supplemented    Hypercalcemia  - Serum Ca 11, serum phos is low, suggesting possibly a PTH mediated process   - hypercalcemia work up with PTH, Vitamin D level, TSH   - patient received IVF, will continue at low rate of 100 cc/hr of normal saline   - Serum calcium now 9.0  - currently patient is denying complaints but possibly could have contributed to patients N/V at [facility]   - resolved s/p IV fluids    Dehydration  - i/s/o recent vomiting   - UA w/ large ketones likely pointing towards a starvation ketosis   - will continue IVF hydration with 100cc/hr LR  - monitor Scr, lytes  - diet liberalized, c/w ensure supplementation, encourage PO intake  - patient is starting to take PO    Schizophrenia  - patient from [facility]  - was on lamotrigine, lithium, clozapine, but not compliant with meds  - psych input appreciated, d/carloz Lamictal, lithium and Clozaril as patient non-compliant w/ medications  - c/w Zyprexa as per psych (ordered for disintegrating tab qhs, with IM dose to be given if refused PO formulation)  - s/p long acting IM haldol decanoate 1/26  - no capacity to leave AMA  ---> non compliant + refusing psych meds , received IM medications, now eating meals, care coordinated w/ psychiatry and [facility] physicians, patient to return to facility for further psychiatric optimization    - dispo back to [facility]

## 2022-01-27 NOTE — PROGRESS NOTE ADULT - ASSESSMENT
57 yo F w/ schizophrenia, hypothyroidism, presenting from [facility] for decreased PO intake, N/V, metabolic derangements, admitted w/ sepsis 2/2 UTI, s/p course of antibiotics and IV fluids with clinical improvement, course c/b medication non-compliance.     # Sepsis 2/2 UTI  - +leukocytosis and tachycardia on admission, w/ +UA  - Ucx non-informative, currently afebrile, not tachycardic, no leukocytosis, sepsis resolved  - completed empiric course of CTX (s/p 5 doses)    # Schizophrenia c/b medication non-compliance  - psych input appreciated, d/carloz lamictal, lithium and clozaril as patient non-compliant w/ medications  - c/w zyprexa as per psych (ordered for disintergrating tab qhs, with IM dose to be given if refused PO formulation)  - s/p long acting IM haldol decanoate 1/26  - no capacity to leave AMA  - anticipate return to [facility] in AM as now starting to take PO (d/w nursing, they are documenting amount on flowsheets), case d/w [facility] Dr. Pereira    # Inadequate oral intake  - suspect due psychiatric condition, c/w psychiatric optimization as above  - nutrition recs appreciated  - diet liberalized, c/w ensure supplementation, encourage PO intake  - patient is starting to take PO    # Hypothyroidism  - c/w synthroid    # Hypokalemia  - K 3.4, replete K    # Hypercalemia  - resolved s/p IV fluids    VTE ppx: lovenox    Dispo: anticipate return to [facility], case d/w Dr. Pereira [facility] physician (to coordinate return in AM) to continue psychiatric optimization there..

## 2022-01-27 NOTE — DISCHARGE NOTE PROVIDER - PROVIDER TOKENS
FREE:[LAST:[Randall],PHONE:[(   )    -],FAX:[(   )    -],ADDRESS:[from [facility]],FOLLOWUP:[1-3 days]]

## 2022-01-27 NOTE — DISCHARGE NOTE PROVIDER - NSDCCPCAREPLAN_GEN_ALL_CORE_FT
PRINCIPAL DISCHARGE DIAGNOSIS  Diagnosis: Acute UTI  Assessment and Plan of Treatment: You have Sepsis secondary to UTI  - You were treated with 2Liter of fluid and antibiotics .  YOur blood culture wwas negative  Continue to monitor for the symptoms of urinary tract infection.  burning urinating or find blood in urine or fever.  Please return to ER        SECONDARY DISCHARGE DIAGNOSES  Diagnosis: Nausea and vomiting  Assessment and Plan of Treatment: secondary to low potassium and hypercalcemia.  Fluid was given and electrolytes imbalance was resolved.    Diagnosis: QT prolongation  Assessment and Plan of Treatment:      PRINCIPAL DISCHARGE DIAGNOSIS  Diagnosis: Acute UTI  Assessment and Plan of Treatment: You have Sepsis secondary to UTI  - You were treated with 2Liter of fluid and antibiotics .  YOur blood culture wwas negative  Continue to monitor for the symptoms of urinary tract infection.  burning urinating or find blood in urine or fever.  Please return to ER        SECONDARY DISCHARGE DIAGNOSES  Diagnosis: Schizophrenia  Assessment and Plan of Treatment: Please take psychiatric medications as prescribed by your doctors at [facility]. You started eating better after getting your psychiatric medications.    Diagnosis: Nausea and vomiting  Assessment and Plan of Treatment: secondary to low potassium and hypercalcemia.  Fluid was given and electrolytes imbalance was resolved.    Diagnosis: QT prolongation  Assessment and Plan of Treatment: Monitor on EKG at your psychiatric facility.

## 2022-01-27 NOTE — DISCHARGE NOTE PROVIDER - CARE PROVIDER_API CALL
Randall   from [facility]  Phone: (   )    -  Fax: (   )    -  Follow Up Time: 1-3 days

## 2022-01-27 NOTE — DISCHARGE NOTE PROVIDER - NSDCMRMEDTOKEN_GEN_ALL_CORE_FT
bisacodyl 5 mg oral delayed release tablet: 2 tab(s) orally once a day  cholecalciferol 1250 mcg (50,000 intl units) oral capsule: 1 cap(s) orally once a month  cloZAPine 100 mg oral tablet: 1 tab(s) orally once a day  Colace 100 mg oral capsule: 1 cap(s) orally 2 times a day  haloperidol decanoate: 200 milligram(s) intramuscular once a month  lamoTRIgine 100 mg oral tablet: 1 tab(s) orally once a day  lithium 600 mg oral capsule: 1 cap(s) orally once a day  Senna 8.6 mg oral tablet: 1 tab(s) orally 2 times a day  Synthroid 25 mcg (0.025 mg) oral tablet: 1 tab(s) orally once a day   bisacodyl 5 mg oral delayed release tablet: 2 tab(s) orally once a day  cholecalciferol 1250 mcg (50,000 intl units) oral capsule: 1 cap(s) orally once a month  OLANZapine 5 mg oral tablet, disintegratin tab(s) orally once a day (at bedtime)  Senna 8.6 mg oral tablet: 1 tab(s) orally 2 times a day  Synthroid 25 mcg (0.025 mg) oral tablet: 1 tab(s) orally once a day

## 2022-01-28 ENCOUNTER — TRANSCRIPTION ENCOUNTER (OUTPATIENT)
Age: 57
End: 2022-01-28

## 2022-01-28 VITALS
DIASTOLIC BLOOD PRESSURE: 89 MMHG | SYSTOLIC BLOOD PRESSURE: 154 MMHG | HEART RATE: 93 BPM | RESPIRATION RATE: 18 BRPM | OXYGEN SATURATION: 100 % | TEMPERATURE: 98 F

## 2022-01-28 LAB
ANION GAP SERPL CALC-SCNC: 16 MMOL/L — HIGH (ref 7–14)
BUN SERPL-MCNC: 3 MG/DL — LOW (ref 7–23)
CALCIUM SERPL-MCNC: 9.8 MG/DL — SIGNIFICANT CHANGE UP (ref 8.4–10.5)
CHLORIDE SERPL-SCNC: 104 MMOL/L — SIGNIFICANT CHANGE UP (ref 98–107)
CO2 SERPL-SCNC: 25 MMOL/L — SIGNIFICANT CHANGE UP (ref 22–31)
CREAT SERPL-MCNC: 0.56 MG/DL — SIGNIFICANT CHANGE UP (ref 0.5–1.3)
GLUCOSE SERPL-MCNC: 105 MG/DL — HIGH (ref 70–99)
HCT VFR BLD CALC: 48.1 % — HIGH (ref 34.5–45)
HGB BLD-MCNC: 15 G/DL — SIGNIFICANT CHANGE UP (ref 11.5–15.5)
MAGNESIUM SERPL-MCNC: 2.1 MG/DL — SIGNIFICANT CHANGE UP (ref 1.6–2.6)
MCHC RBC-ENTMCNC: 28.1 PG — SIGNIFICANT CHANGE UP (ref 27–34)
MCHC RBC-ENTMCNC: 31.2 GM/DL — LOW (ref 32–36)
MCV RBC AUTO: 90.1 FL — SIGNIFICANT CHANGE UP (ref 80–100)
NRBC # BLD: 0 /100 WBCS — SIGNIFICANT CHANGE UP
NRBC # FLD: 0 K/UL — SIGNIFICANT CHANGE UP
PHOSPHATE SERPL-MCNC: 3.9 MG/DL — SIGNIFICANT CHANGE UP (ref 2.5–4.5)
PLATELET # BLD AUTO: 299 K/UL — SIGNIFICANT CHANGE UP (ref 150–400)
POTASSIUM SERPL-MCNC: 3.9 MMOL/L — SIGNIFICANT CHANGE UP (ref 3.5–5.3)
POTASSIUM SERPL-SCNC: 3.9 MMOL/L — SIGNIFICANT CHANGE UP (ref 3.5–5.3)
RBC # BLD: 5.34 M/UL — HIGH (ref 3.8–5.2)
RBC # FLD: 14.6 % — HIGH (ref 10.3–14.5)
SODIUM SERPL-SCNC: 145 MMOL/L — SIGNIFICANT CHANGE UP (ref 135–145)
WBC # BLD: 5.98 K/UL — SIGNIFICANT CHANGE UP (ref 3.8–10.5)
WBC # FLD AUTO: 5.98 K/UL — SIGNIFICANT CHANGE UP (ref 3.8–10.5)

## 2022-01-28 PROCEDURE — 99239 HOSP IP/OBS DSCHRG MGMT >30: CPT

## 2022-01-28 PROCEDURE — 99232 SBSQ HOSP IP/OBS MODERATE 35: CPT

## 2022-01-28 RX ORDER — OLANZAPINE 15 MG/1
1 TABLET, FILM COATED ORAL
Qty: 0 | Refills: 0 | DISCHARGE
Start: 2022-01-28

## 2022-01-28 RX ORDER — CLOZAPINE 150 MG/1
1 TABLET, ORALLY DISINTEGRATING ORAL
Qty: 0 | Refills: 0 | DISCHARGE

## 2022-01-28 RX ORDER — DOCUSATE SODIUM 100 MG
1 CAPSULE ORAL
Qty: 0 | Refills: 0 | DISCHARGE

## 2022-01-28 RX ORDER — HALOPERIDOL DECANOATE 100 MG/ML
200 INJECTION INTRAMUSCULAR
Qty: 0 | Refills: 0 | DISCHARGE

## 2022-01-28 RX ORDER — LAMOTRIGINE 25 MG/1
1 TABLET, ORALLY DISINTEGRATING ORAL
Qty: 0 | Refills: 0 | DISCHARGE

## 2022-01-28 RX ORDER — LITHIUM CARBONATE 300 MG/1
1 TABLET, EXTENDED RELEASE ORAL
Qty: 0 | Refills: 0 | DISCHARGE

## 2022-01-28 NOTE — BH CONSULTATION LIAISON PROGRESS NOTE - NSBHPSYCHOLCOGORIENT_PSY_A_CORE
Unable to assess
Unable to assess
Not fully oriented...
Not fully oriented...
Oriented to time, place, person, situation

## 2022-01-28 NOTE — PROVIDER CONTACT NOTE (MEDICATION) - SITUATION
Pt continues to refuse PO medication at 2200 med pass. IM olanzapine given per standing order in case of refusal.

## 2022-01-28 NOTE — PROVIDER CONTACT NOTE (MEDICATION) - RECOMMENDATIONS
Continue to monitor for effect of IM olanzapine and encourage pt to take medication through education.

## 2022-01-28 NOTE — BH CONSULTATION LIAISON PROGRESS NOTE - NSBHCHARTREVIEWLAB_PSY_A_CORE FT
13.5   6.22  )-----------( 247      ( 24 Jan 2022 06:51 )             42.0   01-24    141  |  106  |  4<L>  ----------------------------<  113<H>  3.2<L>   |  24  |  0.46<L>  01-23    145  |  109<H>  |  6<L>  ----------------------------<  121<H>  3.5   |  25  |  0.50    Ca   9.3   24 Jan 2022 06:51 /  , Ca   9.4   23 Jan 2022 06:11 /    Phos  3.1  01-24 /, Phos  2.9  01-23 /  Mg   2.10   01-24 /, Mg   2.00   01-23 /  
                              13.5   6.22  )-----------( 247      ( 24 Jan 2022 06:51 )             42.0   01-24    141  |  106  |  4<L>  ----------------------------<  113<H>  3.2<L>   |  24  |  0.46<L>    Ca   9.3   24 Jan 2022 06:51 /    Phos  3.1  01-24 /  Mg   2.10   01-24 /  
                      13.4   8.57  )-----------( 287      ( 20 Jan 2022 06:59 )             38.9     01-20    142  |  111<H>  |  12  ----------------------------<  132<H>  2.9<LL>   |  19<L>  |  0.65    Ca    9.4      20 Jan 2022 06:59  Phos  2.9     01-20  Mg     2.30     01-20    TPro  7.0  /  Alb  4.5  /  TBili  0.4  /  DBili  x   /  AST  12  /  ALT  11  /  AlkPhos  139<H>  01-18  
                      13.4   8.57  )-----------( 287      ( 20 Jan 2022 06:59 )             38.9     01-20    142  |  111<H>  |  12  ----------------------------<  132<H>  2.9<LL>   |  19<L>  |  0.65    Ca    9.4      20 Jan 2022 06:59  Phos  2.9     01-20  Mg     2.30     01-20    TPro  7.0  /  Alb  4.5  /  TBili  0.4  /  DBili  x   /  AST  12  /  ALT  11  /  AlkPhos  139<H>  01-18  
                          15.0   5.98  )-----------( 299      ( 28 Jan 2022 06:35 )             48.1   01-28    145  |  104  |  3<L>  ----------------------------<  105<H>  3.9   |  25  |  0.56  01-27    140  |  102  |  3<L>  ----------------------------<  103<H>  3.4<L>   |  23  |  0.50    Ca   9.8   28 Jan 2022 06:35 /  , Ca   9.3   27 Jan 2022 06:11 /    Phos  3.9  01-28 /, Phos  3.3  01-27 /  Mg   2.10   01-28 /, Mg   2.10   01-27 /  
                              13.5   6.22  )-----------( 247      ( 24 Jan 2022 06:51 )             42.0   01-24    141  |  106  |  4<L>  ----------------------------<  113<H>  3.2<L>   |  24  |  0.46<L>    Ca   9.3   24 Jan 2022 06:51 /    Phos  3.1  01-24 /  Mg   2.10   01-24 /

## 2022-01-28 NOTE — BH CONSULTATION LIAISON PROGRESS NOTE - NSBHCHARTREVIEWVS_PSY_A_CORE FT
Vital Signs Last 24 Hrs  T(C): 36.7 (24 Jan 2022 05:01), Max: 36.7 (24 Jan 2022 05:01)  T(F): 98 (24 Jan 2022 05:01), Max: 98 (24 Jan 2022 05:01)  HR: 96 (24 Jan 2022 05:01) (78 - 96)  BP: 140/88 (24 Jan 2022 05:01) (140/88 - 153/78)  BP(mean): --  RR: 18 (24 Jan 2022 05:01) (17 - 18)  SpO2: 99% (24 Jan 2022 05:01) (99% - 99%)
Vital Signs Last 24 Hrs  T(C): 36.7 (20 Jan 2022 12:30), Max: 36.7 (20 Jan 2022 12:30)  T(F): 98 (20 Jan 2022 12:30), Max: 98 (20 Jan 2022 12:30)  HR: 90 (20 Jan 2022 12:30) (90 - 90)  BP: 134/80 (20 Jan 2022 12:30) (134/80 - 134/80)  BP(mean): --  RR: 17 (20 Jan 2022 12:30) (17 - 17)  SpO2: 98% (20 Jan 2022 12:30) (98% - 98%)
Vital Signs Last 24 Hrs  T(C): 36.9 (20 Jan 2022 05:45), Max: 36.9 (20 Jan 2022 05:45)  T(F): 98.4 (20 Jan 2022 05:45), Max: 98.4 (20 Jan 2022 05:45)  HR: 91 (20 Jan 2022 05:45) (88 - 100)  BP: 132/71 (20 Jan 2022 05:45) (128/70 - 148/90)  BP(mean): --  RR: 18 (20 Jan 2022 05:45) (15 - 18)  SpO2: 98% (20 Jan 2022 05:45) (97% - 100%)
Vital Signs Last 24 Hrs  T(C): 36.7 (26 Jan 2022 05:19), Max: 36.8 (25 Jan 2022 22:05)  T(F): 98 (26 Jan 2022 05:19), Max: 98.3 (25 Jan 2022 22:05)  HR: 63 (25 Jan 2022 22:05) (63 - 86)  BP: 176/82 (26 Jan 2022 06:49) (144/85 - 176/82)  BP(mean): --  RR: 18 (26 Jan 2022 05:19) (18 - 18)  SpO2: 100% (26 Jan 2022 05:19) (99% - 100%)
Vital Signs Last 24 Hrs  T(C): 36.8 (28 Jan 2022 06:40), Max: 36.8 (28 Jan 2022 06:40)  T(F): 98.2 (28 Jan 2022 06:40), Max: 98.2 (28 Jan 2022 06:40)  HR: 84 (28 Jan 2022 06:40) (79 - 91)  BP: 143/90 (28 Jan 2022 06:40) (143/90 - 152/96)  BP(mean): --  RR: 17 (28 Jan 2022 06:40) (17 - 18)  SpO2: 100% (28 Jan 2022 06:40) (100% - 100%)
Vital Signs Last 24 Hrs  T(C): 36.7 (25 Jan 2022 11:30), Max: 37 (24 Jan 2022 21:59)  T(F): 98.1 (25 Jan 2022 11:30), Max: 98.6 (24 Jan 2022 21:59)  HR: 86 (25 Jan 2022 11:30) (66 - 86)  BP: 144/85 (25 Jan 2022 11:30) (144/85 - 157/73)  BP(mean): --  RR: 18 (25 Jan 2022 11:30) (18 - 18)  SpO2: 99% (25 Jan 2022 11:30) (99% - 100%)

## 2022-01-28 NOTE — PROGRESS NOTE ADULT - ASSESSMENT
57 yo F w/ schizophrenia, hypothyroidism, presenting from [facility] for decreased PO intake, N/V, metabolic derangements, admitted w/ sepsis 2/2 UTI, s/p course of antibiotics and IV fluids with clinical improvement, course c/b medication non-compliance.     # Sepsis 2/2 UTI  - +leukocytosis and tachycardia on admission, w/ +UA  - Ucx non-informative, currently afebrile, not tachycardic, no leukocytosis, sepsis resolved  - completed empiric course of CTX (s/p 5 doses)    # Schizophrenia c/b medication non-compliance  - psych input appreciated, d/carloz lamictal, lithium and clozaril as patient non-compliant w/ medications  - c/w zyprexa as per psych (ordered for disintergrating tab qhs, with IM dose to be given if refused PO formulation)  - s/p long acting IM haldol decanoate 1/26  - no capacity to leave AMA  - to return to [facility] as now taking PO (documented 26-50% intake on nursing flowsheets), case d/w [facility] Dr. Pereira    # Inadequate oral intake  - suspect due psychiatric condition, c/w psychiatric optimization as above  - nutrition recs appreciated  - diet liberalized, c/w ensure supplementation, encourage PO intake  - patient is taking PO (26-50% of meals)    # Hypothyroidism  - c/w synthroid    # Hypercalemia  - resolved s/p IV fluids    VTE ppx: lovenox    Dispo: return to [facility] today, case d/w Dr. Pereira [facility] physician on day of discharge (regarding hospital course, coordination of return) to continue psychiatric optimization there..    Discharge time 33 minutes

## 2022-01-28 NOTE — PROGRESS NOTE ADULT - SUBJECTIVE AND OBJECTIVE BOX
[facility]  Pager [phone]    Patient is a 56y old  Female who presents with a chief complaint of Decreased PO intake, vomiting (27 Jan 2022 17:49)      INTERVAL HPI/OVERNIGHT EVENTS:    MEDICATIONS  (STANDING):  enoxaparin Injectable 40 milliGRAM(s) SubCutaneous daily  levothyroxine 25 MICROGram(s) Oral daily  OLANZapine Disintegrating Tablet 5 milliGRAM(s) Oral at bedtime  OLANZapine Injectable 5 milliGRAM(s) IntraMuscular at bedtime  polyethylene glycol 3350 17 Gram(s) Oral daily  senna 2 Tablet(s) Oral at bedtime  sodium chloride 0.9%. 1000 milliLiter(s) (30 mL/Hr) IV Continuous <Continuous>    MEDICATIONS  (PRN):  acetaminophen     Tablet .. 650 milliGRAM(s) Oral every 6 hours PRN Temp greater or equal to 38C (100.4F), Mild Pain (1 - 3)  aluminum hydroxide/magnesium hydroxide/simethicone Suspension 30 milliLiter(s) Oral every 4 hours PRN Dyspepsia  melatonin 3 milliGRAM(s) Oral at bedtime PRN Insomnia  OLANZapine 5 milliGRAM(s) Oral every 6 hours PRN agitation  OLANZapine Injectable 5 milliGRAM(s) IntraMuscular every 6 hours PRN severe agitation      Allergies    No Known Allergies    Intolerances        REVIEW OF SYSTEMS:  Please see interval HPI:    Vital Signs Last 24 Hrs  T(C): 36.8 (28 Jan 2022 06:40), Max: 36.8 (28 Jan 2022 06:40)  T(F): 98.2 (28 Jan 2022 06:40), Max: 98.2 (28 Jan 2022 06:40)  HR: 84 (28 Jan 2022 06:40) (79 - 91)  BP: 143/90 (28 Jan 2022 06:40) (143/90 - 152/96)  BP(mean): --  RR: 17 (28 Jan 2022 06:40) (17 - 18)  SpO2: 100% (28 Jan 2022 06:40) (100% - 100%)  I&O's Detail    27 Jan 2022 07:01  -  28 Jan 2022 07:00  --------------------------------------------------------  IN:    Oral Fluid: 600 mL  Total IN: 600 mL    OUT:  Total OUT: 0 mL    Total NET: 600 mL            PHYSICAL EXAM:  GENERAL:   HEAD:    EYES:   ENMT:   NECK:   NERVOUS SYSTEM:    CHEST/LUNG:   HEART:   ABDOMEN:   EXTREMITIES:    LYMPH:   SKIN:     LABS:                        15.0   5.98  )-----------( 299      ( 28 Jan 2022 06:35 )             48.1     28 Jan 2022 06:35    145    |  104    |  3      ----------------------------<  105    3.9     |  25     |  0.56     Ca    9.8        28 Jan 2022 06:35  Phos  3.9       28 Jan 2022 06:35  Mg     2.10      28 Jan 2022 06:35        CAPILLARY BLOOD GLUCOSE        BLOOD CULTURE    RADIOLOGY & ADDITIONAL TESTS:    Imaging Personally Reviewed:  [ ] YES     Consultant(s) Notes Reviewed:      Care Discussed with Consultants/Other Providers: [facility]  Pager [phone]    Patient is a 56y old  Female who presents with a chief complaint of Decreased PO intake, vomiting (27 Jan 2022 17:49)      INTERVAL HPI/OVERNIGHT EVENTS:  Has been eating 26-50% of meals per nursing documentation.     Remains guarded, paranoid, replies "Oh, get away from me", did not want to participate further in interview.     MEDICATIONS  (STANDING):  enoxaparin Injectable 40 milliGRAM(s) SubCutaneous daily  levothyroxine 25 MICROGram(s) Oral daily  OLANZapine Disintegrating Tablet 5 milliGRAM(s) Oral at bedtime  OLANZapine Injectable 5 milliGRAM(s) IntraMuscular at bedtime  polyethylene glycol 3350 17 Gram(s) Oral daily  senna 2 Tablet(s) Oral at bedtime  sodium chloride 0.9%. 1000 milliLiter(s) (30 mL/Hr) IV Continuous <Continuous>    MEDICATIONS  (PRN):  acetaminophen     Tablet .. 650 milliGRAM(s) Oral every 6 hours PRN Temp greater or equal to 38C (100.4F), Mild Pain (1 - 3)  aluminum hydroxide/magnesium hydroxide/simethicone Suspension 30 milliLiter(s) Oral every 4 hours PRN Dyspepsia  melatonin 3 milliGRAM(s) Oral at bedtime PRN Insomnia  OLANZapine 5 milliGRAM(s) Oral every 6 hours PRN agitation  OLANZapine Injectable 5 milliGRAM(s) IntraMuscular every 6 hours PRN severe agitation    Allergies  No Known Allergies    Intolerances    REVIEW OF SYSTEMS:  Please see interval HPI:    Vital Signs Last 24 Hrs  T(C): 36.8 (28 Jan 2022 06:40), Max: 36.8 (28 Jan 2022 06:40)  T(F): 98.2 (28 Jan 2022 06:40), Max: 98.2 (28 Jan 2022 06:40)  HR: 84 (28 Jan 2022 06:40) (79 - 91)  BP: 143/90 (28 Jan 2022 06:40) (143/90 - 152/96)  BP(mean): --  RR: 17 (28 Jan 2022 06:40) (17 - 18)  SpO2: 100% (28 Jan 2022 06:40) (100% - 100%)  I&O's Detail    27 Jan 2022 07:01  -  28 Jan 2022 07:00  --------------------------------------------------------  IN:    Oral Fluid: 600 mL  Total IN: 600 mL    OUT:  Total OUT: 0 mL    Total NET: 600 mL    PHYSICAL EXAM:  GENERAL: NAD, lying in bed  HEAD:  NC/AT  EYES: clear sclera/conjunctiva  ENMT: MMM  NECK: supple  NERVOUS SYSTEM:  blunt affect, remains guarded, moving extremities  CHEST/LUNG: comfortable on RA, no respiratory distress  ABDOMEN: soft, non-tender  EXTREMITIES: no c/c/e       LABS:                        15.0   5.98  )-----------( 299      ( 28 Jan 2022 06:35 )             48.1     28 Jan 2022 06:35    145    |  104    |  3      ----------------------------<  105    3.9     |  25     |  0.56     Ca    9.8        28 Jan 2022 06:35  Phos  3.9       28 Jan 2022 06:35  Mg     2.10      28 Jan 2022 06:35    CAPILLARY BLOOD GLUCOSE    BLOOD CULTURE    RADIOLOGY & ADDITIONAL TESTS:    Imaging Personally Reviewed:  [ ] YES     Consultant(s) Notes Reviewed:      Care Discussed with Consultants/Other Providers: Dr. Randall Sheehan physician [phone] re: hospital course, d/c plan - they will arrange transportation/staff to accompany patient back to [place]. ACP Hope re: d/c planning

## 2022-01-28 NOTE — BH CONSULTATION LIAISON PROGRESS NOTE - NSBHADMITIPOBS_PSY_A_CORE
Routine observation
Constant observation
Routine observation
Constant observation

## 2022-01-28 NOTE — DISCHARGE NOTE NURSING/CASE MANAGEMENT/SOCIAL WORK - PATIENT PORTAL LINK FT
You can access the FollowMyHealth Patient Portal offered by [facility] by registering at the following website: [email or web address]. By joining [facility]’s FollowMyHealth portal, you will also be able to view your health information using other applications (apps) compatible with our system.

## 2022-01-28 NOTE — BH CONSULTATION LIAISON PROGRESS NOTE - NSBHMSETHTPROC_PSY_A_CORE
Perseverative/Illogical/Impaired reasoning/Other
Perseverative/Impaired reasoning
Perseverative/Illogical/Impaired reasoning/Other
Impaired reasoning
Linear
Perseverative/Impaired reasoning

## 2022-01-28 NOTE — BH CONSULTATION LIAISON PROGRESS NOTE - NSBHCONSULTFOLLOWAFTERCARE_PSY_A_CORE FT
No psychiatric contraindications to return to [facility] inpatient for continued psychiatric care once medically optimized
No psychiatric contraindications to return to [facility] inpatient for continued psychiatric care once medically optimized
No psychiatric contraindications to return to [facility] inpatient for continued psychiatric care once medically optimized
No psychiatric contraindications to return to [place] inpatient for continued psychiatric care once medically optimized

## 2022-01-28 NOTE — BH CONSULTATION LIAISON PROGRESS NOTE - NSBHMSESPEECH_PSY_A_CORE
Abnormal as indicated, otherwise normal...
Normal volume, rate, productivity, spontaneity and articulation
Normal volume, rate, productivity, spontaneity and articulation
Abnormal as indicated, otherwise normal...

## 2022-01-28 NOTE — BH CONSULTATION LIAISON PROGRESS NOTE - CASE SUMMARY
agree with above. patient calm but known to be irritable and easily upset at baseline, no combativeness here, eating food slightly, sleeping well, c/w psych meds as above. Does not need 1:1 on ES or on 7S, but otherwise would keep 1:1 as patient impulsive and childish at baseline. 
Chart reviewed, pt. seen and evaluated, I agree with above assessment/plan. Patient alert, remains guarded/paranoid, minimally engaged, giving short answers, denies SI and HI. Interview rather limited at this time. Plan as above. 
Chart reviewed, pt. seen and evaluated, I agree with above assessment/plan, on my evaluation, pt. seems paranoid, suspicious, guarded, uncooperative, refusing to answer most of the questions, and mumbling to herself, when asked about the reason of not eating and taking her meds, pt. replied, " I don't need anything, I am just fine". Care coordinated with Dr. Dewitt ([facility] psychiatrist), she informed me that medication compliance is one of the main issue for the patient, pt. has been court ordered for Clozapine, lithium, Lamictal and Haldol Dec. Patient has received IM Haldol decanoate 200mg on 12/29/21 and next dose is due TOMORROW, 1/26/22. When patient psychiatrically decompensates, she usually stops eating due to worsening psychosis. Discussed about current psychiatric regimen, Dr. Dewitt, agrees that pt. will need standing IM Zyprexa if she is refusing Po meds to prevent further psychiatric decompensation. Plan as above, Case d/w Dr. Chandra, will follow. 
Chart reviewed, case discussed in morning rounds, pt. seen and evaluated with Dr. Henderson, I agree with above assessment/plan. Patient alert, superficially engaged, guarded, seems paranoid/suspicious, she denies AH and VH, however appears internally preoccupied, denies SI and HI. Patient has been refusing PO psychiatric medications. Patient with poor insight into her psychiatric and medical condition. Plan as above, case d/w ACP and Dr. Chandra. Will follow

## 2022-01-28 NOTE — PROGRESS NOTE ADULT - REASON FOR ADMISSION
Decreased PO intake, vomiting

## 2022-01-28 NOTE — BH CONSULTATION LIAISON PROGRESS NOTE - CURRENT MEDICATION
MEDICATIONS  (STANDING):  cefTRIAXone   IVPB 1000 milliGRAM(s) IV Intermittent every 24 hours  enoxaparin Injectable 40 milliGRAM(s) SubCutaneous daily  lamoTRIgine 100 milliGRAM(s) Oral daily  levothyroxine 25 MICROGram(s) Oral daily  lithium 600 milliGRAM(s) Oral daily  polyethylene glycol 3350 17 Gram(s) Oral daily  potassium chloride   Powder 40 milliEquivalent(s) Oral every 4 hours  potassium chloride  10 mEq/100 mL IVPB 10 milliEquivalent(s) IV Intermittent every 1 hour  senna 2 Tablet(s) Oral at bedtime  sodium chloride 0.9%. 1000 milliLiter(s) (30 mL/Hr) IV Continuous <Continuous>    MEDICATIONS  (PRN):  acetaminophen     Tablet .. 650 milliGRAM(s) Oral every 6 hours PRN Temp greater or equal to 38C (100.4F), Mild Pain (1 - 3)  aluminum hydroxide/magnesium hydroxide/simethicone Suspension 30 milliLiter(s) Oral every 4 hours PRN Dyspepsia  melatonin 3 milliGRAM(s) Oral at bedtime PRN Insomnia  
MEDICATIONS  (STANDING):  cefTRIAXone   IVPB 1000 milliGRAM(s) IV Intermittent every 24 hours  cloZAPine 25 milliGRAM(s) Oral at bedtime  enoxaparin Injectable 40 milliGRAM(s) SubCutaneous daily  lamoTRIgine 100 milliGRAM(s) Oral daily  levothyroxine 25 MICROGram(s) Oral daily  lithium 600 milliGRAM(s) Oral daily  polyethylene glycol 3350 17 Gram(s) Oral daily  senna 2 Tablet(s) Oral at bedtime  sodium chloride 0.9%. 1000 milliLiter(s) (30 mL/Hr) IV Continuous <Continuous>    MEDICATIONS  (PRN):  acetaminophen     Tablet .. 650 milliGRAM(s) Oral every 6 hours PRN Temp greater or equal to 38C (100.4F), Mild Pain (1 - 3)  aluminum hydroxide/magnesium hydroxide/simethicone Suspension 30 milliLiter(s) Oral every 4 hours PRN Dyspepsia  melatonin 3 milliGRAM(s) Oral at bedtime PRN Insomnia  
MEDICATIONS  (STANDING):  enoxaparin Injectable 40 milliGRAM(s) SubCutaneous daily  levothyroxine 25 MICROGram(s) Oral daily  OLANZapine Disintegrating Tablet 5 milliGRAM(s) Oral at bedtime  OLANZapine Injectable 5 milliGRAM(s) IntraMuscular at bedtime  polyethylene glycol 3350 17 Gram(s) Oral daily  senna 2 Tablet(s) Oral at bedtime  sodium chloride 0.9%. 1000 milliLiter(s) (30 mL/Hr) IV Continuous <Continuous>    MEDICATIONS  (PRN):  acetaminophen     Tablet .. 650 milliGRAM(s) Oral every 6 hours PRN Temp greater or equal to 38C (100.4F), Mild Pain (1 - 3)  aluminum hydroxide/magnesium hydroxide/simethicone Suspension 30 milliLiter(s) Oral every 4 hours PRN Dyspepsia  melatonin 3 milliGRAM(s) Oral at bedtime PRN Insomnia  OLANZapine 5 milliGRAM(s) Oral every 6 hours PRN agitation  OLANZapine Injectable 5 milliGRAM(s) IntraMuscular every 6 hours PRN severe agitation  
MEDICATIONS  (STANDING):  enoxaparin Injectable 40 milliGRAM(s) SubCutaneous daily  levothyroxine 25 MICROGram(s) Oral daily  polyethylene glycol 3350 17 Gram(s) Oral daily  senna 2 Tablet(s) Oral at bedtime  sodium chloride 0.9%. 1000 milliLiter(s) (30 mL/Hr) IV Continuous <Continuous>    MEDICATIONS  (PRN):  acetaminophen     Tablet .. 650 milliGRAM(s) Oral every 6 hours PRN Temp greater or equal to 38C (100.4F), Mild Pain (1 - 3)  aluminum hydroxide/magnesium hydroxide/simethicone Suspension 30 milliLiter(s) Oral every 4 hours PRN Dyspepsia  melatonin 3 milliGRAM(s) Oral at bedtime PRN Insomnia  OLANZapine 5 milliGRAM(s) Oral every 6 hours PRN agitation  OLANZapine Injectable 5 milliGRAM(s) IntraMuscular every 6 hours PRN severe agitation  
MEDICATIONS  (STANDING):  cloZAPine 25 milliGRAM(s) Oral at bedtime  enoxaparin Injectable 40 milliGRAM(s) SubCutaneous daily  levothyroxine 25 MICROGram(s) Oral daily  lithium 600 milliGRAM(s) Oral daily  polyethylene glycol 3350 17 Gram(s) Oral daily  senna 2 Tablet(s) Oral at bedtime  sodium chloride 0.9%. 1000 milliLiter(s) (30 mL/Hr) IV Continuous <Continuous>    MEDICATIONS  (PRN):  acetaminophen     Tablet .. 650 milliGRAM(s) Oral every 6 hours PRN Temp greater or equal to 38C (100.4F), Mild Pain (1 - 3)  aluminum hydroxide/magnesium hydroxide/simethicone Suspension 30 milliLiter(s) Oral every 4 hours PRN Dyspepsia  melatonin 3 milliGRAM(s) Oral at bedtime PRN Insomnia  OLANZapine 5 milliGRAM(s) Oral every 6 hours PRN agitation  OLANZapine Injectable 5 milliGRAM(s) IntraMuscular every 6 hours PRN severe agitation  
MEDICATIONS  (STANDING):  enoxaparin Injectable 40 milliGRAM(s) SubCutaneous daily  haloperidol decanoate Injectable, Long Acting 200 milliGRAM(s) IntraMuscular once  levothyroxine 25 MICROGram(s) Oral daily  OLANZapine Disintegrating Tablet 5 milliGRAM(s) Oral at bedtime  OLANZapine Injectable 5 milliGRAM(s) IntraMuscular at bedtime  polyethylene glycol 3350 17 Gram(s) Oral daily  senna 2 Tablet(s) Oral at bedtime  sodium chloride 0.9%. 1000 milliLiter(s) (30 mL/Hr) IV Continuous <Continuous>    MEDICATIONS  (PRN):  acetaminophen     Tablet .. 650 milliGRAM(s) Oral every 6 hours PRN Temp greater or equal to 38C (100.4F), Mild Pain (1 - 3)  aluminum hydroxide/magnesium hydroxide/simethicone Suspension 30 milliLiter(s) Oral every 4 hours PRN Dyspepsia  melatonin 3 milliGRAM(s) Oral at bedtime PRN Insomnia  OLANZapine 5 milliGRAM(s) Oral every 6 hours PRN agitation  OLANZapine Injectable 5 milliGRAM(s) IntraMuscular every 6 hours PRN severe agitation

## 2022-01-28 NOTE — BH CONSULTATION LIAISON PROGRESS NOTE - NSBHFUPREASONCONS_PSY_A_CORE
psychosis/med management
med management
psychosis/med management
med management

## 2022-01-28 NOTE — DISCHARGE NOTE NURSING/CASE MANAGEMENT/SOCIAL WORK - NSDCPEFALRISK_GEN_ALL_CORE
For information on Fall & Injury Prevention, visit: [email or web address] OR  [email or web address] OR  https://www.cdc.gov/steadi/patient.html

## 2022-01-28 NOTE — BH CONSULTATION LIAISON PROGRESS NOTE - NSBHFUPINTERVALHXFT_PSY_A_CORE
Chart reviewed. No behavioral health PRNs. Case discussed in morning rounds. Patient continuing to refuse PO psychiatric medications. Eating breakfast.    Patient seen and evaluated. Patient alert, awake, poorly engaged, guarded, seems paranoid/suspicious, and giving very short answers. States she is "fine" and superficially/irritably denies low mood and SHIIP. Further questioning is met with a glare and silence.

## 2022-01-28 NOTE — CHART NOTE - NSCHARTNOTEFT_GEN_A_CORE
Source:  other [x] nurse, medical chart   Diet rx: Regular: Supplement Feeding Modality:  Oral  Ensure Enlive Cans or Servings Per Day:  2       Frequency:  Daily (01-21-22 @ 14:21) [Active]    Pt 57 yo female with schizophrenia, hypothyroidism, presented from [facility] for decreased PO intake, N/V, metabolic derangements - per chart review.    At time of visit, Pt awake, alert; Pt with no interest to speak to RDN; limited information obtained from Pt. Per nurse, Pt's appetite not well; Pt ate ~30% of breakfast this morning. Will recommend to add appetite stimulant to boost Pt's PO intake/appetite. No report of chewing or swallowing difficulty; no report of nausea, vomiting or diarrhea @ this time. Case discussed with nurse. Plan for Pt to be discharged today. RDN remains available.     Pt's height: 65" (1/18)    IBW: 125#+/-10%     Pt's weight: 73.4 kg (1/22)   Pertinent Medications: Lovenox, Aluminum hydroxide/magnesium hydroxide/simethicone Suspension (PRN), Miralax, Senna,   Pertinent Labs: (1/28) Hct 48.1 H, BUN 3 L, Glu 105 H;   (1/18) Albumin 4.5,  H  Skin: +ecchymosis     Estimated Needs: [x] no change since previous assessment  Previous Nutrition Diagnosis: [x] Inadequate Oral Intake    Nutrition Diagnosis is [x] ongoing     Nutrition Interventions/Recommendations:   1. Add appetite stimulant to boost Pt's PO intake/appetite   2. Encourage & assist Pt with meals; Monitor PO diet tolerance; Honor food preferences;   3. Monitor labs, weekly weights, hydration status;

## 2022-01-28 NOTE — BH CONSULTATION LIAISON PROGRESS NOTE - OTHER
internally preoccupied fair to setting suspect paranoia/delusions refusing exam superficially cooperative although answers are short and unelaborated

## 2022-01-28 NOTE — PROVIDER CONTACT NOTE (MEDICATION) - ASSESSMENT
Pt offered PO medication and education provided on indication. Education and re-approach ineffective. Pt pushes away medication and becoming increasingly agitated. IM olanzapine given per standing order.

## 2022-01-28 NOTE — BH CONSULTATION LIAISON PROGRESS NOTE - NSBHPTASSESSDT_PSY_A_CORE
25-Jan-2022 12:50
24-Jan-2022 11:05
21-Jan-2022 09:57
20-Jan-2022 10:22
28-Jan-2022 10:55
26-Jan-2022 11:25

## 2022-01-28 NOTE — BH CONSULTATION LIAISON PROGRESS NOTE - NSBHCONSFOLLOWNEEDS_PSY_ALL_CORE
No psychiatric contraindications to discharge
No psychiatric contraindications to discharge
Needs further psych safety assessment prior to discharge
Needs further psych safety assessment prior to discharge
No psychiatric contraindications to discharge
No psychiatric contraindications to discharge

## 2022-01-28 NOTE — BH CONSULTATION LIAISON PROGRESS NOTE - NSBHCONSULTPRIMARYDISCUSSYES_PSY_A_CORE FT
D/w Dr. Chandra and ACP on unit

## 2022-01-28 NOTE — BH CONSULTATION LIAISON PROGRESS NOTE - NSBHMSESPABN_PSY_A_CORE
Soft volume/Slowed rate/Decreased productivity
Decreased productivity
Soft volume/Slowed rate/Decreased productivity
Decreased productivity

## 2022-01-28 NOTE — DISCHARGE NOTE NURSING/CASE MANAGEMENT/SOCIAL WORK - NSDCVIVACCINE_GEN_ALL_CORE_FT
Tdap; 27-Sep-2020 23:37; Tasha Rivas (RN); [employer]; [id number] (Exp. Date: 31-Jul-2022); IntraMuscular; Deltoid Right.; 0.5 milliLiter(s); VIS (VIS Published: 09-May-2013, VIS Presented: 27-Sep-2020);

## 2022-01-28 NOTE — BH CONSULTATION LIAISON PROGRESS NOTE - NSBHASSESSMENTFT_PSY_ALL_CORE
Patient is a 55 yo female with PPHx of schizophrenia, PMHx of hypothyroidism, HLD, HTN, vit D def, and obesity transferred from [facility] for decreased PO intake, nausea and vomiting, concern for lithium toxicity, found to have metabolic derangements (elevated serum glucose with high anion gap metabolic acidosis concerning for clozapine-induced DKA), leukocytosis and positive UA concerning for UTI, and electrolyte imbalance 2/2 dehydration and vomiting. Seen by [facility] Psych for medication management.     Per [facility] paperwork, patient had lithium on hold 1/18/22 for concern of lithium toxicity and decrease of clozapine from 200 mg QD to 100 mg QD. Unclear if patient had vomited or received her clozapine prior to presentation at [facility] ED. Recent bloodwork from 1/3/22 showed 766 clozapine level (normal 300-700) and lithium level 0.32. Lithium level 1/18/22 was 1.1.     1/20: On assessment, pt was calm and cooperative, responsive to all questions, states that she feels well. She refused repeat ECG this AM. Denies SIIP/HIIP, AVH, or delusions. VSS stable and afebrile, denies any physical discomfort, nausea/vomiting, or abdominal pain. Pt was restarted on lithium 600 mg QD.   1/21: Pt was calm and responsive to all questions, though childish and irritable when asked why she is refusing medications and care. Refused clozapine 25 mg dose last night. Did not require PRN's. She ate breakfast this AM and slept well. In NAD, denies any discomfort, pain, nausea/vomiting or dizziness. K+ level as of 1/20/22 was 2.9, serum glucose 132.   01/24/22: Pt suspicious-appearing, superficially cooperative, TP limited/concrete with impaired reasoning, denying all symptoms and insisting she does not need medications because she is "normal." Refusing medical care/meds. Refusal in engage in care appears to be psychotically driven given paranoid/suspicious behavior.  1/25/22: No change in presentation from yesterday; continuing to refuse medical care/meds likely secondary to psychosis/paranoia.  1/26/22: Remains guarded, paranoid, refusing PO meds/poor po intake likely due to underlying psychosis. Superficially denies SI and HI.    1/28: Remains paranoid/suspicious/guarded with poor cooperativity with interview. Continues to refuse PO psych meds.     PLAN:  1) Cont. Zyprexa Zydis 5mg PO qHS, hold for sedation/hypotension and HOLD if qtc >500    - Give IM Zyprexa 5mg qHS if patient refuses PO as refusal to engage in medical care places patient at imminent risk of harm and appears to be psychotically-driven; patient does not have capacity to refuse psychiatric medication at this time  2) S/p IM Haldol decanoate 200mg administered 1/26/22.  3) PRN for agitation Zyprexa 5mg q6h PRN PO/IM.  **note that IM Zyprexa should not be co-administered with IV/IM Ativan within 2 hrs**  4) Obtain an EKG to monitor QTC.  5) No psychiatric indication for 1:1 CO; patient with obvs per 7S/mindful care unit  6) Patient should return to [place] inpatient once medically optimized; [facility] Psych to follow while medically hospitalized  7) Care coordinated with [place] inpatient psychiatrist Dr. Dewitt #[phone] on 1/25  8) Care coordinated with Dr. Chandra

## 2023-06-11 NOTE — PROGRESS NOTE ADULT - SUBJECTIVE AND OBJECTIVE BOX
[facility] Medicine  Pager [phone]    Patient is a 56y old  Female who presents with a chief complaint of Decreased PO intake, vomiting (26 Jan 2022 11:04)      INTERVAL HPI/OVERNIGHT EVENTS:    MEDICATIONS  (STANDING):  enoxaparin Injectable 40 milliGRAM(s) SubCutaneous daily  levothyroxine 25 MICROGram(s) Oral daily  OLANZapine Disintegrating Tablet 5 milliGRAM(s) Oral at bedtime  OLANZapine Injectable 5 milliGRAM(s) IntraMuscular at bedtime  polyethylene glycol 3350 17 Gram(s) Oral daily  senna 2 Tablet(s) Oral at bedtime  sodium chloride 0.9%. 1000 milliLiter(s) (30 mL/Hr) IV Continuous <Continuous>    MEDICATIONS  (PRN):  acetaminophen     Tablet .. 650 milliGRAM(s) Oral every 6 hours PRN Temp greater or equal to 38C (100.4F), Mild Pain (1 - 3)  aluminum hydroxide/magnesium hydroxide/simethicone Suspension 30 milliLiter(s) Oral every 4 hours PRN Dyspepsia  melatonin 3 milliGRAM(s) Oral at bedtime PRN Insomnia  OLANZapine 5 milliGRAM(s) Oral every 6 hours PRN agitation  OLANZapine Injectable 5 milliGRAM(s) IntraMuscular every 6 hours PRN severe agitation      Allergies    No Known Allergies    Intolerances        REVIEW OF SYSTEMS:  Please see interval HPI:    Vital Signs Last 24 Hrs  T(C): 36.5 (27 Jan 2022 06:10), Max: 36.5 (27 Jan 2022 06:10)  T(F): 97.7 (27 Jan 2022 06:10), Max: 97.7 (27 Jan 2022 06:10)  HR: 75 (27 Jan 2022 06:10) (75 - 75)  BP: 159/88 (27 Jan 2022 06:10) (159/88 - 159/88)  BP(mean): --  RR: 17 (27 Jan 2022 06:10) (17 - 17)  SpO2: 100% (27 Jan 2022 06:10) (100% - 100%)  I&O's Detail        PHYSICAL EXAM:  GENERAL:   HEAD:    EYES:   ENMT:   NECK:   NERVOUS SYSTEM:    CHEST/LUNG:   HEART:   ABDOMEN:   EXTREMITIES:    LYMPH:   SKIN:     LABS:                        15.8   6.09  )-----------( 273      ( 27 Jan 2022 06:11 )             47.0     27 Jan 2022 06:11    140    |  102    |  3      ----------------------------<  103    3.4     |  23     |  0.50     Ca    9.3        27 Jan 2022 06:11  Phos  3.3       27 Jan 2022 06:11  Mg     2.10      27 Jan 2022 06:11        CAPILLARY BLOOD GLUCOSE        BLOOD CULTURE    RADIOLOGY & ADDITIONAL TESTS:    Imaging Personally Reviewed:  [ ] YES     Consultant(s) Notes Reviewed:      Care Discussed with Consultants/Other Providers: [facility]  Pager [phone]    Patient is a 56y old  Female who presents with a chief complaint of Decreased PO intake, vomiting (26 Jan 2022 11:04)      INTERVAL HPI/OVERNIGHT EVENTS:  "Oh shut up and get away from me" Patient did not want to participate further in interview, states "I'm fine."  Received IM medications, per RN has been eating more of her food (25-50% of plate).     MEDICATIONS  (STANDING):  enoxaparin Injectable 40 milliGRAM(s) SubCutaneous daily  levothyroxine 25 MICROGram(s) Oral daily  OLANZapine Disintegrating Tablet 5 milliGRAM(s) Oral at bedtime  OLANZapine Injectable 5 milliGRAM(s) IntraMuscular at bedtime  polyethylene glycol 3350 17 Gram(s) Oral daily  senna 2 Tablet(s) Oral at bedtime  sodium chloride 0.9%. 1000 milliLiter(s) (30 mL/Hr) IV Continuous <Continuous>    MEDICATIONS  (PRN):  acetaminophen     Tablet .. 650 milliGRAM(s) Oral every 6 hours PRN Temp greater or equal to 38C (100.4F), Mild Pain (1 - 3)  aluminum hydroxide/magnesium hydroxide/simethicone Suspension 30 milliLiter(s) Oral every 4 hours PRN Dyspepsia  melatonin 3 milliGRAM(s) Oral at bedtime PRN Insomnia  OLANZapine 5 milliGRAM(s) Oral every 6 hours PRN agitation  OLANZapine Injectable 5 milliGRAM(s) IntraMuscular every 6 hours PRN severe agitation      Allergies  No Known Allergies    Intolerances    REVIEW OF SYSTEMS:  Please see interval HPI:    Vital Signs Last 24 Hrs  T(C): 36.5 (27 Jan 2022 06:10), Max: 36.5 (27 Jan 2022 06:10)  T(F): 97.7 (27 Jan 2022 06:10), Max: 97.7 (27 Jan 2022 06:10)  HR: 75 (27 Jan 2022 06:10) (75 - 75)  BP: 159/88 (27 Jan 2022 06:10) (159/88 - 159/88)  RR: 17 (27 Jan 2022 06:10) (17 - 17)  SpO2: 100% (27 Jan 2022 06:10) (100% - 100%)  I&O's Detail    PHYSICAL EXAM:  GENERAL: NAD, lying in bed  HEAD:  NC/AT  EYES: clear sclera/conjunctiva  ENMT: MMM  NECK: supple  NERVOUS SYSTEM:  blunt affect, more expressive/"colorful" speech today, moving extremities  CHEST/LUNG: comfortable on RA, no respiratory distress  ABDOMEN: soft, non-tender  EXTREMITIES: no c/c/e     LABS:                        15.8   6.09  )-----------( 273      ( 27 Jan 2022 06:11 )             47.0     27 Jan 2022 06:11    140    |  102    |  3      ----------------------------<  103    3.4     |  23     |  0.50     Ca    9.3        27 Jan 2022 06:11  Phos  3.3       27 Jan 2022 06:11  Mg     2.10      27 Jan 2022 06:11    CAPILLARY BLOOD GLUCOSE    BLOOD CULTURE    RADIOLOGY & ADDITIONAL TESTS:    Imaging Personally Reviewed:  [ ] YES     Consultant(s) Notes Reviewed:      Care Discussed with Consultants/Other Providers: JENNIFER Kemp re: coordinating return to [place] as patient now taking in PO. [place] physician Dr. Pereira [phone] re: hospital course, PO psych meds (lamictal, lithium, clozaril) d/carloz, on IM zyprexa as coordinated w/  and [place] psychiatrist, she requests for patient to return to [place] in AM (call in morning to arrange transportation/coordinate dispo) as staff not available this afternoon normal

## 2023-10-17 ENCOUNTER — EMERGENCY (EMERGENCY)
Facility: HOSPITAL | Age: 58
LOS: 1 days | Discharge: HOME | End: 2023-10-17
Attending: EMERGENCY MEDICINE | Admitting: EMERGENCY MEDICINE
Payer: SELF-PAY

## 2023-10-17 VITALS
OXYGEN SATURATION: 100 % | RESPIRATION RATE: 18 BRPM | SYSTOLIC BLOOD PRESSURE: 132 MMHG | HEART RATE: 99 BPM | TEMPERATURE: 99 F | DIASTOLIC BLOOD PRESSURE: 83 MMHG

## 2023-10-17 VITALS
HEART RATE: 85 BPM | RESPIRATION RATE: 18 BRPM | DIASTOLIC BLOOD PRESSURE: 85 MMHG | OXYGEN SATURATION: 98 % | SYSTOLIC BLOOD PRESSURE: 120 MMHG | TEMPERATURE: 98 F

## 2023-10-17 PROCEDURE — 93010 ELECTROCARDIOGRAM REPORT: CPT

## 2023-10-17 PROCEDURE — 99285 EMERGENCY DEPT VISIT HI MDM: CPT

## 2023-10-17 NOTE — ED ADULT NURSE NOTE - NSFALLUNIVINTERV_ED_ALL_ED
Bed/Stretcher in lowest position, wheels locked, appropriate side rails in place/Call bell, personal items and telephone in reach/Instruct patient to call for assistance before getting out of bed/chair/stretcher/Non-slip footwear applied when patient is off stretcher/[place] to call system/Physically safe environment - no spills, clutter or unnecessary equipment/Purposeful proactive rounding/Room/bathroom lighting operational, light cord in reach

## 2023-10-17 NOTE — ED ADULT NURSE NOTE - CHIEF COMPLAINT QUOTE
pt sent from [facility] for cardiology consult, pt scheduled for ECT had abnormal EKG reading this am, pt denies any chest pain denies sob, appears anxious, asking to go back to [facility]

## 2023-10-17 NOTE — PROVIDER CONTACT NOTE (OTHER) - ASSESSMENT
Pt is returning to [place] [address]; called and spoke with Ms Demarco; she reports she will send transportation as noted in previous SW note.
, pt is cleared and able to return to previous residence [facility] [address] [place], [place]. Pt is accompanied by two staff members. SW has spoken to Ms. Demarco  RN ([phone]) who confirmed that pt's mode of transportation is facility vehicle and that pt travels with supervision. Clinical provider is in agreement with facility vehicle back to group home. Verbal huddle completed. Per Ms. Moss Provider from ER has to call the doctor in [facility]. [phone], Dr. Stevens.

## 2023-10-17 NOTE — ED ADULT NURSE NOTE - OBJECTIVE STATEMENT
Patient received in room 16. Patient referred to the ED from [facility] accompanied by staff member for "abnormal EKG". Patient A&Ox3 and ambulatory at baseline. phx schizoprenia. Airway patent, chest rise equal bilaterally, lung sounds clear and equal bilaterally, respirations unlabored. Patient denies dyspnea, shortness of breath, and chest pain. Abdomen flat, soft and non-distended; patient denies nausea/vomiting and changes in BMs/urine. Pulse/motor/sensory present and no edema noted to all four extremities. Patient refusing blood work at this time; MD Ibarra at bedside. Patient offers no complaints at this time. Safety measures in place, call bell within reach, and [facility] staff member at bedside.

## 2023-10-17 NOTE — ED PROVIDER NOTE - OBJECTIVE STATEMENT
see MDM/progress notes see MDM/progress notes  DD ED ATTF p/w sent in for "abnormal EKG".  pt from [facility] and was planned to get ECT but apparently the EKG was abnormal so they need cardiology clearance.  Pt is inpt at [facility], here with aide.  Pt denies complaints and is declining testing or workup here.  EKG from today is ST at 102 and no princess no std no twi   qtc 466.  We would need to sedate pt to obtain labs, given she has no complaints and her EKG is normal, uncertain benefit.  D/w Dr at [facility] Dr Stevens, he agrees.  OK for d/c back to [facility].    VS:  unremarkable    GEN - NAD;   well appearing;   A+O x3   HEAD - NC/AT     ENT - PEERL, EOMI, mucous membranes    moist , no discharge      NECK: Neck supple, non-tender without lymphadenopathy, no masses, no JVD  PULM - CTA b/l,  symmetric breath sounds  COR -  normal heart sounds    ABD - , ND, NT, soft,  BACK - no CVA tenderness, nontender spine     EXTREMS - no edema, no deformity, warm and well perfused    SKIN - no rash    or bruising      NEUROLOGIC - alert, face symmetric, speech fluent, sensation nl, motor no focal deficit.

## 2023-10-17 NOTE — ED PROVIDER NOTE - ATTENDING CONTRIBUTION TO CARE
57F p/w sent in for "abnormal EKG".  pt from [facility] and was planned to get ECT but apparently the EKG was abnormal so they need cardiology clearance.  Pt is inpt at [facility], here with aide.  Pt denies complaints and is declining testing or workup here.  EKG from today is ST at 102 and no princess no std no twi   qtc 466.  We would need to sedate pt to obtain labs, given she has no complaints and her EKG is normal, uncertain benefit.  D/w Dr at [facility] Dr Stevens, he agrees.  OK for d/c back to [facility].    VS:  unremarkable    GEN - NAD;   well appearing;   A+O x3   HEAD - NC/AT     ENT - PEERL, EOMI, mucous membranes    moist , no discharge      NECK: Neck supple, non-tender without lymphadenopathy, no masses, no JVD  PULM - CTA b/l,  symmetric breath sounds  COR -  normal heart sounds    ABD - , ND, NT, soft,  BACK - no CVA tenderness, nontender spine     EXTREMS - no edema, no deformity, warm and well perfused    SKIN - no rash    or bruising      NEUROLOGIC - alert, face symmetric, speech fluent, sensation nl, motor no focal deficit.

## 2023-10-17 NOTE — PROVIDER CONTACT NOTE (OTHER) - ACTION/TREATMENT ORDERED:
Pt to travel back to [facility] via agency arranged transport; will be accompanied by [facility] staff.

## 2023-10-17 NOTE — ED PROVIDER NOTE - NSFOLLOWUPINSTRUCTIONS_ED_ALL_ED_FT
You were seen in the ER today for abnormal EKG.    Please follow up with your primary care doctor and cardiologist within 1 - 3 days. Call and let them know you were seen in the ER today.   Bring the results of your blood work and imaging with you to your appointment, if applicable.    Return to the ER for any worsening symptoms or concerns, including chest pain, shortness of breath, lightheadedness, weakness, or any other concerns.

## 2023-10-17 NOTE — ED PROVIDER NOTE - PHYSICAL EXAMINATION
see MDM/progress notes see MDM/progress notes    DD ED ATTG:     VS:  unremarkable    GEN - NAD;   well appearing;   A+O x3   HEAD - NC/AT     ENT - PEERL, EOMI, mucous membranes    moist , no discharge      NECK: Neck supple, non-tender without lymphadenopathy, no masses, no JVD  PULM - CTA b/l,  symmetric breath sounds  COR -  normal heart sounds    ABD - , ND, NT, soft,  BACK - no CVA tenderness, nontender spine     EXTREMS - no edema, no deformity, warm and well perfused    SKIN - no rash    or bruising      NEUROLOGIC - alert, face symmetric, speech fluent, sensation nl, motor no focal deficit.

## 2023-10-17 NOTE — ED PROVIDER NOTE - PROGRESS NOTE DETAILS
DD ED ATTG: d[facility]w PMD at [facility] Elstein.  EKG with 1 TWI in III, which is a normal variant.  qtc 480 not surprising in a female taking psychiatric medications.  Pt calm and cooperative.  No CP or SOB or leg swelling.  No indication for further cardiac workup in hospital.  Will d/c back to [facility], Dr Stevens agrees.

## 2023-10-17 NOTE — ED PROVIDER NOTE - CLINICAL SUMMARY MEDICAL DECISION MAKING FREE TEXT BOX
Nicole Ibarra DO PGY-3  HPI:   57-year-old female with history of schizophrenia and hypothyroidism presents to the ED sent from cardiologist office where she was sent for cardiac clearance prior to ECT.  Discussed with patient's medical doctor at [facility], who states that she was found to have an abnormal EKG with a Q wave and possible PVC and was sent to cardiology for evaluation.  It appears that patient was not cooperative during medical screening and was sent to the ER.  Patient has no acute complaints.    ROS:   Denies fever, chills, chest pain, shortness of breath, abdominal pain, nausea, vomiting, diarrhea, dysuria, hematuria    PHYSICAL EXAM:  CONSTITUTIONAL: Well appearing, awake, alert, oriented to person, place, time/situation and in no apparent distress.  HEAD: Atraumatic, no step offs.  NECK: Supple, no meningismus.   EYES: Clear bilaterally, pupils equal, round and reactive to light.  ENMT: Airway patent, Nasal mucosa clear. Mouth with normal mucosa. Uvula is midline.   CARDIAC: Normal rate, regular rhythm. +S1/S2. No murmurs, rubs or gallops.  RESPIRATORY: Breathing unlabored. Breath sounds clear and equal bilaterally.  ABDOMEN:  Soft, nontender, nondistended. No rebound tenderness or guarding.  FLANK: No CVA tenderness B/L.  NEUROLOGICAL: Alert and oriented, no focal deficits, no motor or sensory deficits.   MSK: No clubbing, cyanosis, or edema.   SKIN: Skin warm and dry. No evidence of rashes or lesions.    MDM:   57-year-old female with history of schizophrenia and hypothyroidism presented to the ED from cardiologist office where she was uncooperative with medical screening exam.  Patient herself has no acute complaints.  Patient had serial EKGs performed at cardiologist office which showed isolated Q-wave in lead III without any T wave inversions, ST elevations or depressions, no other ischemic changes no PVCs or arrhythmia.  Had discussion with Dr. Stevens, patient's primary doctor who cleared patient for discharge back to [facility].

## 2023-10-17 NOTE — ED ADULT TRIAGE NOTE - CHIEF COMPLAINT QUOTE
pt sent from [facility] for cardiology consult, pt scheduled for ECT had abnormal EKG reading this am, pt denies any chest pain denies sob, appears anxious, asking to go back to [facility]

## 2023-10-17 NOTE — ED PROVIDER NOTE - PATIENT PORTAL LINK FT
You can access the FollowMyHealth Patient Portal offered by [facility] by registering at the following website: [email or web address]. By joining [employer]’s FollowMyHealth portal, you will also be able to view your health information using other applications (apps) compatible with our system.

## 2023-10-17 NOTE — ED PROVIDER NOTE - NS_EDPROVIDERDISPOUSERTYPE_ED_A_ED
"Pt completed distress screening with score of 4 endorsing concerns related to "a little" fear, nervousness and sadness". LCSW available for psychosocial needs. Theresa Okeefe LCSW  " Attending Attestation (For Attendings USE Only)...

## 2024-07-23 NOTE — ED ADULT NURSE NOTE - TEMPLATE LIST FOR HEAD TO TOE ASSESSMENT
Emailed patient the following appointment reminders:    Endoscopy @ [facility]  Monday, 7/29/24 @ 0730    CT Chest/ABD/Pelvis @ [facility]  Monday, 8/5/24 @ 1 pm, arrive at 1230  Nothing to eat/drink 4 hours prior    F/U Dr. Larios  Wednesday, 8/14/24 @ 1200  
General
